# Patient Record
Sex: FEMALE | Race: WHITE | NOT HISPANIC OR LATINO
[De-identification: names, ages, dates, MRNs, and addresses within clinical notes are randomized per-mention and may not be internally consistent; named-entity substitution may affect disease eponyms.]

---

## 2017-05-18 ENCOUNTER — APPOINTMENT (OUTPATIENT)
Dept: MRI IMAGING | Facility: IMAGING CENTER | Age: 82
End: 2017-05-18

## 2017-05-18 ENCOUNTER — OUTPATIENT (OUTPATIENT)
Dept: OUTPATIENT SERVICES | Facility: HOSPITAL | Age: 82
LOS: 1 days | End: 2017-05-18
Payer: MEDICARE

## 2017-05-18 DIAGNOSIS — Z00.8 ENCOUNTER FOR OTHER GENERAL EXAMINATION: ICD-10-CM

## 2017-05-18 PROCEDURE — 73221 MRI JOINT UPR EXTREM W/O DYE: CPT

## 2017-07-22 ENCOUNTER — INPATIENT (INPATIENT)
Facility: HOSPITAL | Age: 82
LOS: 1 days | Discharge: HOME | DRG: 872 | End: 2017-07-24
Attending: HOSPITALIST | Admitting: HOSPITALIST
Payer: MEDICARE

## 2017-07-22 VITALS
DIASTOLIC BLOOD PRESSURE: 82 MMHG | SYSTOLIC BLOOD PRESSURE: 157 MMHG | TEMPERATURE: 100 F | RESPIRATION RATE: 21 BRPM | HEART RATE: 105 BPM | OXYGEN SATURATION: 95 %

## 2017-07-22 DIAGNOSIS — R52 PAIN, UNSPECIFIED: ICD-10-CM

## 2017-07-22 DIAGNOSIS — E87.1 HYPO-OSMOLALITY AND HYPONATREMIA: ICD-10-CM

## 2017-07-22 DIAGNOSIS — R74.8 ABNORMAL LEVELS OF OTHER SERUM ENZYMES: ICD-10-CM

## 2017-07-22 DIAGNOSIS — J18.9 PNEUMONIA, UNSPECIFIED ORGANISM: ICD-10-CM

## 2017-07-22 DIAGNOSIS — E78.5 HYPERLIPIDEMIA, UNSPECIFIED: ICD-10-CM

## 2017-07-22 DIAGNOSIS — E04.1 NONTOXIC SINGLE THYROID NODULE: Chronic | ICD-10-CM

## 2017-07-22 DIAGNOSIS — R11.0 NAUSEA: ICD-10-CM

## 2017-07-22 DIAGNOSIS — R50.9 FEVER, UNSPECIFIED: ICD-10-CM

## 2017-07-22 DIAGNOSIS — Z98.890 OTHER SPECIFIED POSTPROCEDURAL STATES: Chronic | ICD-10-CM

## 2017-07-22 DIAGNOSIS — A41.9 SEPSIS, UNSPECIFIED ORGANISM: ICD-10-CM

## 2017-07-22 DIAGNOSIS — R05 COUGH: ICD-10-CM

## 2017-07-22 LAB
ALBUMIN SERPL ELPH-MCNC: 4 G/DL — SIGNIFICANT CHANGE UP (ref 3.3–5)
ALP SERPL-CCNC: 251 U/L — HIGH (ref 40–120)
ALT FLD-CCNC: 63 U/L RC — HIGH (ref 10–45)
ANION GAP SERPL CALC-SCNC: 15 MMOL/L — SIGNIFICANT CHANGE UP (ref 5–17)
APPEARANCE UR: ABNORMAL
AST SERPL-CCNC: 49 U/L — HIGH (ref 10–40)
BACTERIA # UR AUTO: ABNORMAL /HPF
BASE EXCESS BLDV CALC-SCNC: 1.7 MMOL/L — SIGNIFICANT CHANGE UP (ref -2–2)
BASOPHILS # BLD AUTO: 0 K/UL — SIGNIFICANT CHANGE UP (ref 0–0.2)
BASOPHILS NFR BLD AUTO: 0.2 % — SIGNIFICANT CHANGE UP (ref 0–2)
BILIRUB SERPL-MCNC: 0.5 MG/DL — SIGNIFICANT CHANGE UP (ref 0.2–1.2)
BILIRUB UR-MCNC: NEGATIVE — SIGNIFICANT CHANGE UP
BUN SERPL-MCNC: 13 MG/DL — SIGNIFICANT CHANGE UP (ref 7–23)
CA-I SERPL-SCNC: 1.11 MMOL/L — LOW (ref 1.12–1.3)
CALCIUM SERPL-MCNC: 8.9 MG/DL — SIGNIFICANT CHANGE UP (ref 8.4–10.5)
CHLORIDE BLDV-SCNC: 96 MMOL/L — SIGNIFICANT CHANGE UP (ref 96–108)
CHLORIDE SERPL-SCNC: 92 MMOL/L — LOW (ref 96–108)
CO2 BLDV-SCNC: 27 MMOL/L — SIGNIFICANT CHANGE UP (ref 22–30)
CO2 SERPL-SCNC: 24 MMOL/L — SIGNIFICANT CHANGE UP (ref 22–31)
COLOR SPEC: YELLOW — SIGNIFICANT CHANGE UP
CREAT SERPL-MCNC: 0.81 MG/DL — SIGNIFICANT CHANGE UP (ref 0.5–1.3)
DIFF PNL FLD: ABNORMAL
EOSINOPHIL # BLD AUTO: 0 K/UL — SIGNIFICANT CHANGE UP (ref 0–0.5)
EOSINOPHIL NFR BLD AUTO: 0.2 % — SIGNIFICANT CHANGE UP (ref 0–6)
GAS PNL BLDV: 129 MMOL/L — LOW (ref 136–145)
GAS PNL BLDV: SIGNIFICANT CHANGE UP
GAS PNL BLDV: SIGNIFICANT CHANGE UP
GLUCOSE BLDV-MCNC: 113 MG/DL — HIGH (ref 70–99)
GLUCOSE SERPL-MCNC: 112 MG/DL — HIGH (ref 70–99)
GLUCOSE UR QL: NEGATIVE — SIGNIFICANT CHANGE UP
HCO3 BLDV-SCNC: 26 MMOL/L — SIGNIFICANT CHANGE UP (ref 21–29)
HCT VFR BLD CALC: 35.6 % — SIGNIFICANT CHANGE UP (ref 34.5–45)
HCT VFR BLDA CALC: 38 % — LOW (ref 39–50)
HGB BLD CALC-MCNC: 12.4 G/DL — SIGNIFICANT CHANGE UP (ref 11.5–15.5)
HGB BLD-MCNC: 12.4 G/DL — SIGNIFICANT CHANGE UP (ref 11.5–15.5)
HOROWITZ INDEX BLDV+IHG-RTO: SIGNIFICANT CHANGE UP
KETONES UR-MCNC: ABNORMAL
LACTATE BLDV-MCNC: 1.3 MMOL/L — SIGNIFICANT CHANGE UP (ref 0.7–2)
LEUKOCYTE ESTERASE UR-ACNC: NEGATIVE — SIGNIFICANT CHANGE UP
LYMPHOCYTES # BLD AUTO: 0.7 K/UL — LOW (ref 1–3.3)
LYMPHOCYTES # BLD AUTO: 5.5 % — LOW (ref 13–44)
MCHC RBC-ENTMCNC: 32.6 PG — SIGNIFICANT CHANGE UP (ref 27–34)
MCHC RBC-ENTMCNC: 34.7 GM/DL — SIGNIFICANT CHANGE UP (ref 32–36)
MCV RBC AUTO: 93.9 FL — SIGNIFICANT CHANGE UP (ref 80–100)
MONOCYTES # BLD AUTO: 0.9 K/UL — SIGNIFICANT CHANGE UP (ref 0–0.9)
MONOCYTES NFR BLD AUTO: 7.9 % — SIGNIFICANT CHANGE UP (ref 2–14)
NEUTROPHILS # BLD AUTO: 10.3 K/UL — HIGH (ref 1.8–7.4)
NEUTROPHILS NFR BLD AUTO: 86.2 % — HIGH (ref 43–77)
NITRITE UR-MCNC: NEGATIVE — SIGNIFICANT CHANGE UP
OTHER CELLS CSF MANUAL: 9 ML/DL — LOW (ref 18–22)
PCO2 BLDV: 40 MMHG — SIGNIFICANT CHANGE UP (ref 35–50)
PH BLDV: 7.42 — SIGNIFICANT CHANGE UP (ref 7.35–7.45)
PH UR: 6 — SIGNIFICANT CHANGE UP (ref 5–8)
PLATELET # BLD AUTO: 232 K/UL — SIGNIFICANT CHANGE UP (ref 150–400)
PO2 BLDV: 28 MMHG — SIGNIFICANT CHANGE UP (ref 25–45)
POTASSIUM BLDV-SCNC: 3.4 MMOL/L — LOW (ref 3.5–5)
POTASSIUM SERPL-MCNC: 3.7 MMOL/L — SIGNIFICANT CHANGE UP (ref 3.5–5.3)
POTASSIUM SERPL-SCNC: 3.7 MMOL/L — SIGNIFICANT CHANGE UP (ref 3.5–5.3)
PROT SERPL-MCNC: 7.7 G/DL — SIGNIFICANT CHANGE UP (ref 6–8.3)
PROT UR-MCNC: 100 MG/DL
RAPID RVP RESULT: DETECTED
RBC # BLD: 3.79 M/UL — LOW (ref 3.8–5.2)
RBC # FLD: 11.7 % — SIGNIFICANT CHANGE UP (ref 10.3–14.5)
RBC CASTS # UR COMP ASSIST: SIGNIFICANT CHANGE UP /HPF (ref 0–2)
RV+EV RNA SPEC QL NAA+PROBE: DETECTED
SAO2 % BLDV: 52 % — LOW (ref 67–88)
SODIUM SERPL-SCNC: 131 MMOL/L — LOW (ref 135–145)
SP GR SPEC: 1.03 — HIGH (ref 1.01–1.02)
UROBILINOGEN FLD QL: NEGATIVE — SIGNIFICANT CHANGE UP
WBC # BLD: 11.9 K/UL — HIGH (ref 3.8–10.5)
WBC # FLD AUTO: 11.9 K/UL — HIGH (ref 3.8–10.5)
WBC UR QL: SIGNIFICANT CHANGE UP /HPF (ref 0–5)

## 2017-07-22 PROCEDURE — 71020: CPT | Mod: 26

## 2017-07-22 PROCEDURE — 99285 EMERGENCY DEPT VISIT HI MDM: CPT | Mod: GC

## 2017-07-22 RX ORDER — ACETAMINOPHEN 500 MG
1000 TABLET ORAL ONCE
Qty: 0 | Refills: 0 | Status: COMPLETED | OUTPATIENT
Start: 2017-07-22 | End: 2017-07-22

## 2017-07-22 RX ORDER — SODIUM CHLORIDE 9 MG/ML
500 INJECTION INTRAMUSCULAR; INTRAVENOUS; SUBCUTANEOUS ONCE
Qty: 0 | Refills: 0 | Status: COMPLETED | OUTPATIENT
Start: 2017-07-22 | End: 2017-07-22

## 2017-07-22 RX ORDER — DORZOLAMIDE HYDROCHLORIDE TIMOLOL MALEATE 20; 5 MG/ML; MG/ML
1 SOLUTION/ DROPS OPHTHALMIC
Qty: 0 | Refills: 0 | COMMUNITY

## 2017-07-22 RX ORDER — ONDANSETRON 8 MG/1
4 TABLET, FILM COATED ORAL ONCE
Qty: 0 | Refills: 0 | Status: COMPLETED | OUTPATIENT
Start: 2017-07-22 | End: 2017-07-22

## 2017-07-22 RX ORDER — DORZOLAMIDE HYDROCHLORIDE 20 MG/ML
1 SOLUTION/ DROPS OPHTHALMIC
Qty: 0 | Refills: 0 | COMMUNITY

## 2017-07-22 RX ORDER — CEFTRIAXONE 500 MG/1
1 INJECTION, POWDER, FOR SOLUTION INTRAMUSCULAR; INTRAVENOUS ONCE
Qty: 0 | Refills: 0 | Status: COMPLETED | OUTPATIENT
Start: 2017-07-22 | End: 2017-07-22

## 2017-07-22 RX ORDER — OXYCODONE AND ACETAMINOPHEN 5; 325 MG/1; MG/1
1 TABLET ORAL EVERY 6 HOURS
Qty: 0 | Refills: 0 | Status: DISCONTINUED | OUTPATIENT
Start: 2017-07-22 | End: 2017-07-24

## 2017-07-22 RX ORDER — SERTRALINE 25 MG/1
1 TABLET, FILM COATED ORAL
Qty: 0 | Refills: 0 | COMMUNITY

## 2017-07-22 RX ORDER — ASPIRIN/CALCIUM CARB/MAGNESIUM 324 MG
1 TABLET ORAL
Qty: 0 | Refills: 0 | COMMUNITY

## 2017-07-22 RX ORDER — AZITHROMYCIN 500 MG/1
500 TABLET, FILM COATED ORAL ONCE
Qty: 0 | Refills: 0 | Status: COMPLETED | OUTPATIENT
Start: 2017-07-22 | End: 2017-07-22

## 2017-07-22 RX ORDER — LEVOTHYROXINE SODIUM 125 MCG
1 TABLET ORAL
Qty: 0 | Refills: 0 | COMMUNITY

## 2017-07-22 RX ORDER — METOCLOPRAMIDE HCL 10 MG
5 TABLET ORAL ONCE
Qty: 0 | Refills: 0 | Status: COMPLETED | OUTPATIENT
Start: 2017-07-22 | End: 2017-07-22

## 2017-07-22 RX ORDER — DOCUSATE SODIUM 100 MG
3 CAPSULE ORAL
Qty: 0 | Refills: 0 | COMMUNITY

## 2017-07-22 RX ORDER — IPRATROPIUM/ALBUTEROL SULFATE 18-103MCG
3 AEROSOL WITH ADAPTER (GRAM) INHALATION EVERY 6 HOURS
Qty: 0 | Refills: 0 | Status: DISCONTINUED | OUTPATIENT
Start: 2017-07-22 | End: 2017-07-24

## 2017-07-22 RX ORDER — LATANOPROST 0.05 MG/ML
1 SOLUTION/ DROPS OPHTHALMIC; TOPICAL
Qty: 0 | Refills: 0 | COMMUNITY

## 2017-07-22 RX ORDER — SERTRALINE 25 MG/1
0 TABLET, FILM COATED ORAL
Qty: 0 | Refills: 0 | COMMUNITY

## 2017-07-22 RX ORDER — SODIUM CHLORIDE 9 MG/ML
1000 INJECTION INTRAMUSCULAR; INTRAVENOUS; SUBCUTANEOUS
Qty: 0 | Refills: 0 | Status: DISCONTINUED | OUTPATIENT
Start: 2017-07-22 | End: 2017-07-23

## 2017-07-22 RX ORDER — OMEPRAZOLE 10 MG/1
1 CAPSULE, DELAYED RELEASE ORAL
Qty: 0 | Refills: 0 | COMMUNITY

## 2017-07-22 RX ORDER — ACETAMINOPHEN 500 MG
650 TABLET ORAL EVERY 6 HOURS
Qty: 0 | Refills: 0 | Status: DISCONTINUED | OUTPATIENT
Start: 2017-07-22 | End: 2017-07-24

## 2017-07-22 RX ORDER — PANTOPRAZOLE SODIUM 20 MG/1
40 TABLET, DELAYED RELEASE ORAL
Qty: 0 | Refills: 0 | Status: DISCONTINUED | OUTPATIENT
Start: 2017-07-22 | End: 2017-07-24

## 2017-07-22 RX ORDER — ONDANSETRON 8 MG/1
4 TABLET, FILM COATED ORAL EVERY 8 HOURS
Qty: 0 | Refills: 0 | Status: DISCONTINUED | OUTPATIENT
Start: 2017-07-22 | End: 2017-07-24

## 2017-07-22 RX ADMIN — SODIUM CHLORIDE 50 MILLILITER(S): 9 INJECTION INTRAMUSCULAR; INTRAVENOUS; SUBCUTANEOUS at 10:46

## 2017-07-22 RX ADMIN — CEFTRIAXONE 100 GRAM(S): 500 INJECTION, POWDER, FOR SOLUTION INTRAMUSCULAR; INTRAVENOUS at 04:04

## 2017-07-22 RX ADMIN — Medication 5 MILLIGRAM(S): at 17:34

## 2017-07-22 RX ADMIN — Medication 3 MILLILITER(S): at 10:46

## 2017-07-22 RX ADMIN — SODIUM CHLORIDE 1000 MILLILITER(S): 9 INJECTION INTRAMUSCULAR; INTRAVENOUS; SUBCUTANEOUS at 04:05

## 2017-07-22 RX ADMIN — Medication 1000 MILLIGRAM(S): at 05:32

## 2017-07-22 RX ADMIN — ONDANSETRON 4 MILLIGRAM(S): 8 TABLET, FILM COATED ORAL at 12:32

## 2017-07-22 RX ADMIN — Medication 400 MILLIGRAM(S): at 03:19

## 2017-07-22 RX ADMIN — AZITHROMYCIN 250 MILLIGRAM(S): 500 TABLET, FILM COATED ORAL at 05:32

## 2017-07-22 RX ADMIN — ONDANSETRON 4 MILLIGRAM(S): 8 TABLET, FILM COATED ORAL at 03:20

## 2017-07-22 RX ADMIN — Medication 3 MILLILITER(S): at 21:23

## 2017-07-22 NOTE — H&P ADULT - PMH
Anxiety    Back ache    Depression    Glaucoma    H pylori ulcer    HLD (hyperlipidemia)    Thyroid disease

## 2017-07-22 NOTE — H&P ADULT - NSHPSOCIALHISTORY_GEN_ALL_CORE
Social History: retired nurse at [employer], 15 year remote history of smoking, nonsignificant social alcohol use, patient lives at home with ex- and son lives nearby

## 2017-07-22 NOTE — ED PROVIDER NOTE - ATTENDING CONTRIBUTION TO CARE
MD Jeffrey:  patient seen and evaluated with the resident.  I was present for key portions of the History & Physical, and I agree with the Impression & Plan.  MD Jeffrey:  82 yo F, c/o 36 hours cough, fever, nausea.  Zero abd pain.  Flew < 6 hrs flight from [place] to [facility] yesterday, during which time she developed these symptoms.  VS:  Hr 105, Temp 100.2 (oral), normotensive.  Physical Exam: elderly F, nad, NCAT, +productive-sounding cough, no respiratory distress.  No calf pain or leg edema.  no CP.  Impression:  r/o PNA; if CXR clear might consider RVP.  Plan:  CXR, VBG, labs, reassess.

## 2017-07-22 NOTE — H&P ADULT - ASSESSMENT
81 year old woman PMH thyroid disease, H pylori, back pain s/p laminectomy presenting with two day history of acute onset cough with associated fever and chills, with chest x-ray revealing clear lungs and Entero/Rhino+, likely viral illness. 81 year old woman PMH thyroid disease, H pylori, back pain s/p laminectomy presenting with two day history of acute onset cough with associated fever and chills, N/V with chest x-ray revealing clear lungs and Entero/Rhino+, likely viral illness.

## 2017-07-22 NOTE — H&P ADULT - PROBLEM SELECTOR PLAN 7
- Will continue home percocet 5/325 PO PRN q6h for back pain - Patient reports subjective fevers at home, afebrile since admission, Tmax 100.2  - Acetaminophen 650 mg PRN q6h for T >100.4

## 2017-07-22 NOTE — H&P ADULT - PROBLEM SELECTOR PLAN 3
- Will hold atorvastatin for elevated liver enzymes - Nausea improved with zofran in ED  - Continue with zofran 4 mg IV q6h prn  - For decreased PO intake, will start IV fluids 50 cc/hr NS  - Continue with regular diet, advance as tolerated - Na 131, hyponatremia likely secondary to stress related to infection and/or decreased PO intake  - Will follow-up Na level  - will give gentle hydration 50cc/hr due to poor intake and vomiting

## 2017-07-22 NOTE — H&P ADULT - NSHPREVIEWOFSYSTEMS_GEN_ALL_CORE
REVIEW OF SYSTEMS:  CONSTITUTIONAL: +FEVER, weight loss, or fatigue  EYES: No changes in vision  ENMT:  +Sore Throat (mild)  NECK: No pain or stiffness  RESPIRATORY: No wheezing, or hemoptysis; No shortness of breath  CARDIOVASCULAR: No chest pain, palpitations, dizziness, or leg swelling  GASTROINTESTINAL: No abdominal or epigastric pain. No nausea, vomiting, or hematemesis; No diarrhea or constipation. No melena or hematochezia.  GENITOURINARY: No dysuria, frequency, incontinence  NEUROLOGICAL: +Mild headaches, No confusion  SKIN: No rashes or lesions  LYMPH NODES: No enlarged glands REVIEW OF SYSTEMS:  CONSTITUTIONAL: No weight loss  EYES: No changes in vision  ENMT:  +Sore Throat (mild)  NECK: No pain or stiffness  RESPIRATORY: No wheezing, or hemoptysis; No shortness of breath  CARDIOVASCULAR: No chest pain, palpitations, dizziness, or leg swelling  GASTROINTESTINAL: +N/V No abdominal or epigastric pain. No hematemesis; No diarrhea or constipation. No melena or hematochezia.  GENITOURINARY: No dysuria, frequency, incontinence  NEUROLOGICAL: +Mild headaches, No confusion  SKIN: No rashes or lesions  LYMPH NODES: No enlarged glands

## 2017-07-22 NOTE — H&P ADULT - PROBLEM SELECTOR PLAN 5
- Alk phos 251, ALT 63, AST 49  - Will determine baseline LFTs  - Will hold atorvastatin - Will hold atorvastatin for elevated liver enzymes - Alk phos 251, ALT 63, AST 49  - Will determine baseline LFTs, continue to monitor  - Will hold atorvastatin

## 2017-07-22 NOTE — ED ADULT NURSE NOTE - OBJECTIVE STATEMENT
80 yo female presents to the ED from home c/o cough, N/V, HA, chills x2days. patient states symptoms began when she was in [place]. patient states fever today of 100.5. patient is AAOx4. lung sounds wheezes bilaterally. cap refill <3sec. cough present, non-productive. patient denies blood in vomit, chest pain, SOB, HA, dizziness, urinary frequency or urgency, abdominal pain, back pain. VSS. MD at bedside.

## 2017-07-22 NOTE — H&P ADULT - NSHPLABSRESULTS_GEN_ALL_CORE
LABS:                        12.4   11.9  )-----------( 232      ( 2017 03:12 )             35.6     WBC Trend: 11.9<--      131<L>  |  92<L>  |  13  ----------------------------<  112<H>  3.7   |  24  |  0.81    Ca    8.9      2017 03:12    TPro  7.7  /  Alb  4.0  /  TBili  0.5  /  DBili  x   /  AST  49<H>  /  ALT  63<H>  /  AlkPhos  251<H>      Creatinine Trend: 0.81<--        Urinalysis Basic - ( 2017 06:46 )    Color: Yellow / Appearance: x / S.029 / pH: x  Gluc: x / Ketone: Trace  / Bili: Negative / Urobili: Negative   Blood: x / Protein: 100 mg/dL / Nitrite: Negative   Leuk Esterase: Negative / RBC: 3-5 /HPF / WBC 3-5 /HPF   Sq Epi: x / Non Sq Epi: x / Bacteria: Few /HPF    Rhino/Enterovirus Positive        RADIOLOGY & ADDITIONAL TESTS: Chest X-ray - clear lungs    Imaging Personally Reviewed: Chest X-ray    Consultant(s) Notes Reviewed:      Care Discussed with Consultants/Other Providers:

## 2017-07-22 NOTE — H&P ADULT - PROBLEM SELECTOR PLAN 6
- Patient reports subjective fevers at home, afebrile since admission, Tmax 100.2  - Acetaminophen 650 mg q6h prn for T >100.4 - Patient reports subjective fevers at home, afebrile since admission, Tmax 100.2  - Acetaminophen 650 mg PRN q6h for T >100.4 - Will hold atorvastatin for elevated liver enzymes

## 2017-07-22 NOTE — ED PROVIDER NOTE - MEDICAL DECISION MAKING DETAILS
Cough non-productive with fevers and severe nausea and vomiting. Concern for pna will get lab work and check for other infectious etiology. No abd pain or tenderness.

## 2017-07-22 NOTE — ED PROVIDER NOTE - OBJECTIVE STATEMENT
81 YOF with cough x 2 days non-productive, +chills +subjective fever. Recent flight from [place]. Denies SOB, Denies calf pain. Pt was vomiting while on the flight and has continued to be nauseated and vomiting for a few days. Pt has not been tolerating PO.   PMD: Piter Armando  nauseated +vomiting, decreased  Pt chest pain with coughing.

## 2017-07-22 NOTE — H&P ADULT - PROBLEM SELECTOR PLAN 1
- 2 day history of cough with associated fever and nausea, likely viral illness with +Entero/Rhino and absence of consolidation on chest x-ray  - CAP less likely, will discontinue antibiotics and monitor  - Follow-up blood cx, urine cx, and legionella antigen test - 2 day history of cough with associated fever and nausea, likely viral illness with +Entero/Rhino and absence of consolidation on chest x-ray  - CAP less likely, will discontinue antibiotics and monitor  - Follow-up blood cx, urine cx, and legionella antigen test  - Will start Duoneb 3 mL PRN q6h for wheezing or shortness of breath - 2 day history of cough with associated fever and nausea, likely viral illness with +Entero/Rhino and absence of consolidation on chest x-ray  - CAP less likely, will discontinue antibiotics and monitor  - Follow-up blood cx, urine cx, and legionella antigen test  - Will start Duoneb 3 mL PRN q6h for wheezing or shortness of breath  -supportive care as needed - Patient WBC 11.9, , T 100.2 in ED, meets sepsis, likely in setting of +Entero/Rhinovurs  - Follow-up blood cx, urine cx, and legionella antigen test

## 2017-07-22 NOTE — H&P ADULT - FAMILY HISTORY
Sibling  Still living? Unknown  Family history of non-Hodgkin's lymphoma, Age at diagnosis: Age Unknown  Family history of coronary artery disease, Age at diagnosis: Age Unknown  Family history of stomach cancer, Age at diagnosis: Age Unknown

## 2017-07-22 NOTE — ED ADULT NURSE REASSESSMENT NOTE - NS ED NURSE REASSESS COMMENT FT1
Received elderly female alert and oriented x4 actively coughing. Pt reports that she does feel better now prior to ER arrival. Pt reports a non productive cough ,vitals signs stable. Pt given to holding.

## 2017-07-22 NOTE — H&P ADULT - HISTORY OF PRESENT ILLNESS
87 year old F patient with PMH of thyroid disease, back pain (s/p laminotomy 2014), H. Pylori, depression is admitted with a 2 day history of cough and subjective fever. The patient returned yesterday morning from a 2 week trip to [facility] [id number]. The patient reports she was in her normal state of health until Thursday morning (7/20) when she developed a cough. She described the cough bothered her the entire day. She also experienced a decreased appetite. On Friday morning, the patient's cough worsened and "became full-blown," and also experienced severe nausea. The patient boarded a 6 hour flight from [place] to [place] and experienced nausea and vomiting on the plane, vomiting 6 times during flight, each time productive with mucus with no blood. The patient has eaten very little since Thursday, last meal was sips of soup. 87 year old F patient with PMH of thyroid disease, back pain (s/p laminotomy 2014), H. Pylori, depression is admitted with a 2 day history of cough and subjective fever. The patient returned yesterday morning from a 2 week trip to [place]. The patient reports she was in her normal state of health until Thursday morning (7/20) when she developed a cough. She also felt chills and took one tylenol. She described the cough bothered her the entire day. She also experienced a decreased appetite. On Friday morning, the patient's cough worsened and "became full-blown," and also experienced severe nausea. The patient boarded a 6 hour flight from [place] to [place] and experienced nausea and vomiting on the plane, vomiting 6 times during flight, each time productive with mucus with no blood. The patient has eaten very little since Thursday, last meal was sips of soup. The patient reports chest pain experienced only with coughing. Denies any shortness of breath. The patient denies any abdominal pain, constipation, or diarrhea. The patient one sick contact, her sister who had a cough, on Sunday 7/16. Denies any contact with animals.    PMH:  Thyroid Disease  Depression  H. Pylori  Back Pain (s/p laminectomy)  Glaucoma  Hyperlipidemia 81 year old F patient with PMH of thyroid disease, back pain (s/p laminotomy 2014), H. Pylori, depression is admitted with a 2 day history of cough and subjective fever. The patient returned yesterday morning from a 2 week trip to [place]. The patient reports she was in her normal state of health until Thursday morning (7/20) when she developed a cough. She also felt chills and took one tylenol. She described the cough bothered her the entire day. She also experienced a decreased appetite. On Friday morning, the patient's cough worsened and "became full-blown," and also experienced severe nausea. The patient boarded a 6 hour flight from [place] to [place] and experienced nausea and vomiting on the plane, vomiting 6 times during flight, each time productive with mucus with no blood. The patient has eaten very little since Thursday, last meal was sips of soup. The patient reports chest pain experienced only with coughing. Denies any shortness of breath. The patient denies any abdominal pain, constipation, or diarrhea. The patient one sick contact, her sister who had a cough, on Sunday 7/16. Denies any contact with animals.    PMH:  Thyroid Disease  Depression  H. Pylori  Back Pain (s/p laminectomy)  Glaucoma  Hyperlipidemia 81 year old F patient with PMH of thyroid disease, back pain (s/p laminotomy 2014), H. Pylori, depression is admitted with a 2 day history of cough and subjective fever. The patient returned yesterday morning from a 2 week trip to [place]. The patient reports she was in her normal state of health until Thursday morning (7/20) when she developed a cough. She also felt chills and took one tylenol. She described the cough bothered her the entire day. She also experienced a decreased appetite. On Friday morning, the patient's cough worsened and "became full-blown," and also experienced severe nausea. The patient boarded a 6 hour flight from [place] to [place] and experienced nausea and vomiting on the plane, vomiting 6 times during flight, each time productive with mucus with no blood. The patient has eaten very little since Thursday, last meal was sips of soup. The patient reports chest pain experienced only with coughing. Denies any shortness of breath. The patient denies any abdominal pain, constipation, or diarrhea. The patient one sick contact, her sister who had a cough, on Sunday 7/16. Denies any contact with animals.    PMH:  Thyroid Disease  Depression  H. Pylori  Back Pain (s/p laminectomy)  Glaucoma  Hyperlipidemia    Allergies: no known drug allergies  Medications: 81 year old F patient with PMH of thyroid disease, back pain (s/p laminotomy 2014), H. Pylori, depression is admitted with a 2 day history of cough and subjective fever. The patient returned yesterday morning from a 2 week trip to [facility] [employer]. The patient reports she was in her normal state of health until Thursday morning (7/20) when she developed a cough. She described the cough bothered her the entire day and worsened. She describes the cough as non-productive, but feeling like she needs to cough something up. She also experienced a decreased appetite. On Friday morning, the patient's cough worsened and "became full-blown," and also experienced severe nausea. The patient boarded a 6 hour flight from [place] to [place] and experienced nausea on plane, vomiting 6 times during flight, each time productive with mucus and no blood. The patient has eaten little since Thursday, last meal was a few sips of soup Friday afternoon. The patient reports chest pain experienced only with coughing. Patient also endorses a mild headache. Denies any chest pain at rest or shortness of breath. The patient denies any abdominal pain, constipation, or diarrhea. The patient encountered one sick contact, her sister who had a cough, on Sunday 7/16. Denies any contact with animals. 81 year old F patient with PMH of thyroid disease, back pain (s/p laminotomy 2014), H. Pylori, and depression admitted with a 2 day history of cough and subjective fever. The patient returned yesterday morning from a 2 week trip to [place]. The patient reports she was in her normal state of health until Thursday morning (7/20) when she developed a cough. She described the cough bothered her the entire day and worsened. She describes the cough as non-productive, but feeling like she needs to cough something up. She also experienced a decreased appetite. On Friday morning, the patient's cough worsened and "became full-blown," and also experienced severe nausea. The patient boarded a 6 hour flight from [place] to [place] and experienced nausea on plane, vomiting 6 times during flight, each time productive with mucus and no blood. The patient has eaten little since Thursday, last meal was a few sips of soup Friday afternoon. The patient reports chest pain experienced only with coughing. Patient also endorses a mild headache. Denies any chest pain at rest or shortness of breath. The patient denies any abdominal pain, constipation, or diarrhea. The patient encountered one sick contact, her sister who had a cough, on Sunday 7/16. Denies any contact with animals.

## 2017-07-22 NOTE — H&P ADULT - PROBLEM SELECTOR PLAN 2
- Na 131, hyponatremia likely secondary to stress related to infection and decreased PO intake - Na 131, hyponatremia likely secondary to stress related to infection and/or decreased PO intake  - Will follow-up Na level - Na 131, hyponatremia likely secondary to stress related to infection and/or decreased PO intake  - Will follow-up Na level  - will give gentle hydration 50cc/hr due to poor intake and vomiting - 2 day history of cough with associated fever and nausea, likely viral illness with +Entero/Rhino and absence of consolidation on chest x-ray  - Will start Duoneb 3 mL PRN q6h for wheezing or shortness of breath  -supportive care as needed

## 2017-07-23 DIAGNOSIS — R63.8 OTHER SYMPTOMS AND SIGNS CONCERNING FOOD AND FLUID INTAKE: ICD-10-CM

## 2017-07-23 DIAGNOSIS — Z29.9 ENCOUNTER FOR PROPHYLACTIC MEASURES, UNSPECIFIED: ICD-10-CM

## 2017-07-23 LAB
ALBUMIN SERPL ELPH-MCNC: 3.4 G/DL — SIGNIFICANT CHANGE UP (ref 3.3–5)
ALP SERPL-CCNC: 238 U/L — HIGH (ref 40–120)
ALT FLD-CCNC: 41 U/L — SIGNIFICANT CHANGE UP (ref 10–45)
ANION GAP SERPL CALC-SCNC: 14 MMOL/L — SIGNIFICANT CHANGE UP (ref 5–17)
AST SERPL-CCNC: 37 U/L — SIGNIFICANT CHANGE UP (ref 10–40)
BASOPHILS # BLD AUTO: 0.02 K/UL — SIGNIFICANT CHANGE UP (ref 0–0.2)
BASOPHILS NFR BLD AUTO: 0.2 % — SIGNIFICANT CHANGE UP (ref 0–2)
BILIRUB SERPL-MCNC: 0.4 MG/DL — SIGNIFICANT CHANGE UP (ref 0.2–1.2)
BUN SERPL-MCNC: 8 MG/DL — SIGNIFICANT CHANGE UP (ref 7–23)
CALCIUM SERPL-MCNC: 8.7 MG/DL — SIGNIFICANT CHANGE UP (ref 8.4–10.5)
CHLORIDE SERPL-SCNC: 97 MMOL/L — SIGNIFICANT CHANGE UP (ref 96–108)
CO2 SERPL-SCNC: 23 MMOL/L — SIGNIFICANT CHANGE UP (ref 22–31)
CREAT SERPL-MCNC: 0.67 MG/DL — SIGNIFICANT CHANGE UP (ref 0.5–1.3)
CULTURE RESULTS: SIGNIFICANT CHANGE UP
EOSINOPHIL # BLD AUTO: 0.03 K/UL — SIGNIFICANT CHANGE UP (ref 0–0.5)
EOSINOPHIL NFR BLD AUTO: 0.3 % — SIGNIFICANT CHANGE UP (ref 0–6)
GLUCOSE SERPL-MCNC: 86 MG/DL — SIGNIFICANT CHANGE UP (ref 70–99)
HCT VFR BLD CALC: 31.2 % — LOW (ref 34.5–45)
HGB BLD-MCNC: 10.5 G/DL — LOW (ref 11.5–15.5)
IMM GRANULOCYTES NFR BLD AUTO: 0.2 % — SIGNIFICANT CHANGE UP (ref 0–1.5)
LEGIONELLA AG UR QL: NEGATIVE — SIGNIFICANT CHANGE UP
LYMPHOCYTES # BLD AUTO: 1.1 K/UL — SIGNIFICANT CHANGE UP (ref 1–3.3)
LYMPHOCYTES # BLD AUTO: 9.8 % — LOW (ref 13–44)
MAGNESIUM SERPL-MCNC: 2 MG/DL — SIGNIFICANT CHANGE UP (ref 1.6–2.6)
MCHC RBC-ENTMCNC: 30.3 PG — SIGNIFICANT CHANGE UP (ref 27–34)
MCHC RBC-ENTMCNC: 33.7 GM/DL — SIGNIFICANT CHANGE UP (ref 32–36)
MCV RBC AUTO: 89.9 FL — SIGNIFICANT CHANGE UP (ref 80–100)
MONOCYTES # BLD AUTO: 0.83 K/UL — SIGNIFICANT CHANGE UP (ref 0–0.9)
MONOCYTES NFR BLD AUTO: 7.4 % — SIGNIFICANT CHANGE UP (ref 2–14)
NEUTROPHILS # BLD AUTO: 9.23 K/UL — HIGH (ref 1.8–7.4)
NEUTROPHILS NFR BLD AUTO: 82.1 % — HIGH (ref 43–77)
PHOSPHATE SERPL-MCNC: 2.4 MG/DL — LOW (ref 2.5–4.5)
PLATELET # BLD AUTO: 225 K/UL — SIGNIFICANT CHANGE UP (ref 150–400)
POTASSIUM SERPL-MCNC: 3.8 MMOL/L — SIGNIFICANT CHANGE UP (ref 3.5–5.3)
POTASSIUM SERPL-SCNC: 3.8 MMOL/L — SIGNIFICANT CHANGE UP (ref 3.5–5.3)
PROT SERPL-MCNC: 6.8 G/DL — SIGNIFICANT CHANGE UP (ref 6–8.3)
RBC # BLD: 3.47 M/UL — LOW (ref 3.8–5.2)
RBC # FLD: 13.9 % — SIGNIFICANT CHANGE UP (ref 10.3–14.5)
SODIUM SERPL-SCNC: 134 MMOL/L — LOW (ref 135–145)
SPECIMEN SOURCE: SIGNIFICANT CHANGE UP
WBC # BLD: 11.23 K/UL — HIGH (ref 3.8–10.5)
WBC # FLD AUTO: 11.23 K/UL — HIGH (ref 3.8–10.5)

## 2017-07-23 PROCEDURE — 99233 SBSQ HOSP IP/OBS HIGH 50: CPT

## 2017-07-23 RX ORDER — POLYETHYLENE GLYCOL 3350 17 G/17G
17 POWDER, FOR SOLUTION ORAL DAILY
Qty: 0 | Refills: 0 | Status: DISCONTINUED | OUTPATIENT
Start: 2017-07-23 | End: 2017-07-24

## 2017-07-23 RX ORDER — SODIUM CHLORIDE 9 MG/ML
1000 INJECTION INTRAMUSCULAR; INTRAVENOUS; SUBCUTANEOUS
Qty: 0 | Refills: 0 | Status: DISCONTINUED | OUTPATIENT
Start: 2017-07-23 | End: 2017-07-24

## 2017-07-23 RX ORDER — BENZOCAINE AND MENTHOL 5; 1 G/100ML; G/100ML
1 LIQUID ORAL ONCE
Qty: 0 | Refills: 0 | Status: COMPLETED | OUTPATIENT
Start: 2017-07-23 | End: 2017-07-23

## 2017-07-23 RX ORDER — SENNA PLUS 8.6 MG/1
1 TABLET ORAL AT BEDTIME
Qty: 0 | Refills: 0 | Status: DISCONTINUED | OUTPATIENT
Start: 2017-07-23 | End: 2017-07-24

## 2017-07-23 RX ORDER — HEPARIN SODIUM 5000 [USP'U]/ML
5000 INJECTION INTRAVENOUS; SUBCUTANEOUS EVERY 8 HOURS
Qty: 0 | Refills: 0 | Status: DISCONTINUED | OUTPATIENT
Start: 2017-07-23 | End: 2017-07-24

## 2017-07-23 RX ADMIN — Medication 3 MILLILITER(S): at 21:20

## 2017-07-23 RX ADMIN — Medication 3 MILLILITER(S): at 05:15

## 2017-07-23 RX ADMIN — HEPARIN SODIUM 5000 UNIT(S): 5000 INJECTION INTRAVENOUS; SUBCUTANEOUS at 21:16

## 2017-07-23 RX ADMIN — SODIUM CHLORIDE 50 MILLILITER(S): 9 INJECTION INTRAMUSCULAR; INTRAVENOUS; SUBCUTANEOUS at 10:13

## 2017-07-23 RX ADMIN — HEPARIN SODIUM 5000 UNIT(S): 5000 INJECTION INTRAVENOUS; SUBCUTANEOUS at 12:20

## 2017-07-23 RX ADMIN — BENZOCAINE AND MENTHOL 1 LOZENGE: 5; 1 LIQUID ORAL at 12:19

## 2017-07-23 RX ADMIN — PANTOPRAZOLE SODIUM 40 MILLIGRAM(S): 20 TABLET, DELAYED RELEASE ORAL at 05:15

## 2017-07-23 NOTE — PROGRESS NOTE ADULT - ATTENDING COMMENTS
Patient seen and examined.  Agree with resident note as above.  Meds, labs and vitals all reviewed.  Patient with Enterovirus/Rhinovirus sepsis.   Currently being treated with supportive interventions.   Patient is still having some nausea and difficulty tolerating full PO.  Supportive care, Zofran.

## 2017-07-23 NOTE — PROGRESS NOTE ADULT - ASSESSMENT
81 year old woman PMH thyroid disease, H pylori, back pain s/p laminectomy presenting with three day history of acute onset cough with associated fever, chills, Nausea, and vomiting, diffuse wheezes and rhonchi b/l, chest x-ray revealing clear lungs and Entero/Rhino+, likely viral bronchitis.

## 2017-07-23 NOTE — PROGRESS NOTE ADULT - PROBLEM SELECTOR PLAN 7
- Patient reports subjective fevers at home, afebrile since admission, Tmax 100.2  - Acetaminophen 650 mg PRN q6h for T >100.4

## 2017-07-23 NOTE — PROGRESS NOTE ADULT - PROBLEM SELECTOR PLAN 1
- Patient WBC 11.9, , T 100.2 in ED, met sepsis criteria, likely in setting of +Entero/Rhinovirus viral bronchitis, sepsis resolved  - Legionella antigen negative  - Blood culture and urine culture negative to date

## 2017-07-23 NOTE — PROGRESS NOTE ADULT - PROBLEM SELECTOR PLAN 2
- 2 day history of cough with associated fever and nausea, likely viral bronchitis with +Entero/Rhino and absence of consolidation on chest x-ray  - Duoneb PRN q6h for wheezing or shortness of breath  - supportive care as needed

## 2017-07-23 NOTE — PROGRESS NOTE ADULT - PROBLEM SELECTOR PLAN 1
- Patient WBC 11.9, , T 100.2 in ED, met sepsis criteria, likely in setting of +Entero/Rhinovirus viral bronchitis  - Legionella antigen negative  - Blood culture and urine culture negative to date

## 2017-07-23 NOTE — PROGRESS NOTE ADULT - PROBLEM SELECTOR PLAN 4
- Nausea improved with 1x reglan  - Continue with zofran 4 mg IV q8h prn  - For decreased PO intake, will continue IV fluids 50 cc/hr NS  - Continue with regular diet, advance as tolerated

## 2017-07-23 NOTE — PROGRESS NOTE ADULT - PROBLEM SELECTOR PLAN 3
- Na 131, hyponatremia likely secondary to stress related to infection and/or decreased PO intake  - Will follow-up Na level  - will give gentle hydration 50cc/hr due to poor intake and vomiting

## 2017-07-23 NOTE — PROGRESS NOTE ADULT - PROBLEM SELECTOR PLAN 8
- Will continue home percocet 5/325 PO PRN q6h for back pain  - Will provide Acetaminophen 650 mg PRN q6h for moderate pain

## 2017-07-24 ENCOUNTER — TRANSCRIPTION ENCOUNTER (OUTPATIENT)
Age: 82
End: 2017-07-24

## 2017-07-24 VITALS
HEART RATE: 65 BPM | SYSTOLIC BLOOD PRESSURE: 133 MMHG | TEMPERATURE: 98 F | DIASTOLIC BLOOD PRESSURE: 60 MMHG | RESPIRATION RATE: 18 BRPM | OXYGEN SATURATION: 96 %

## 2017-07-24 LAB
ALBUMIN SERPL ELPH-MCNC: 3.4 G/DL — SIGNIFICANT CHANGE UP (ref 3.3–5)
ALP SERPL-CCNC: 273 U/L — HIGH (ref 40–120)
ALT FLD-CCNC: 52 U/L — HIGH (ref 10–45)
ANION GAP SERPL CALC-SCNC: 15 MMOL/L — SIGNIFICANT CHANGE UP (ref 5–17)
AST SERPL-CCNC: 47 U/L — HIGH (ref 10–40)
BASOPHILS # BLD AUTO: 0.01 K/UL — SIGNIFICANT CHANGE UP (ref 0–0.2)
BASOPHILS NFR BLD AUTO: 0.1 % — SIGNIFICANT CHANGE UP (ref 0–2)
BILIRUB SERPL-MCNC: 0.4 MG/DL — SIGNIFICANT CHANGE UP (ref 0.2–1.2)
BUN SERPL-MCNC: 7 MG/DL — SIGNIFICANT CHANGE UP (ref 7–23)
CALCIUM SERPL-MCNC: 8.7 MG/DL — SIGNIFICANT CHANGE UP (ref 8.4–10.5)
CHLORIDE SERPL-SCNC: 95 MMOL/L — LOW (ref 96–108)
CO2 SERPL-SCNC: 24 MMOL/L — SIGNIFICANT CHANGE UP (ref 22–31)
CREAT SERPL-MCNC: 0.54 MG/DL — SIGNIFICANT CHANGE UP (ref 0.5–1.3)
EOSINOPHIL # BLD AUTO: 0.08 K/UL — SIGNIFICANT CHANGE UP (ref 0–0.5)
EOSINOPHIL NFR BLD AUTO: 0.7 % — SIGNIFICANT CHANGE UP (ref 0–6)
GLUCOSE SERPL-MCNC: 97 MG/DL — SIGNIFICANT CHANGE UP (ref 70–99)
HCT VFR BLD CALC: 30.7 % — LOW (ref 34.5–45)
HGB BLD-MCNC: 10.4 G/DL — LOW (ref 11.5–15.5)
IMM GRANULOCYTES NFR BLD AUTO: 0.2 % — SIGNIFICANT CHANGE UP (ref 0–1.5)
LYMPHOCYTES # BLD AUTO: 0.86 K/UL — LOW (ref 1–3.3)
LYMPHOCYTES # BLD AUTO: 8 % — LOW (ref 13–44)
MAGNESIUM SERPL-MCNC: 2 MG/DL — SIGNIFICANT CHANGE UP (ref 1.6–2.6)
MCHC RBC-ENTMCNC: 30.1 PG — SIGNIFICANT CHANGE UP (ref 27–34)
MCHC RBC-ENTMCNC: 33.9 GM/DL — SIGNIFICANT CHANGE UP (ref 32–36)
MCV RBC AUTO: 88.7 FL — SIGNIFICANT CHANGE UP (ref 80–100)
MONOCYTES # BLD AUTO: 0.71 K/UL — SIGNIFICANT CHANGE UP (ref 0–0.9)
MONOCYTES NFR BLD AUTO: 6.6 % — SIGNIFICANT CHANGE UP (ref 2–14)
NEUTROPHILS # BLD AUTO: 9.07 K/UL — HIGH (ref 1.8–7.4)
NEUTROPHILS NFR BLD AUTO: 84.4 % — HIGH (ref 43–77)
PHOSPHATE SERPL-MCNC: 2.9 MG/DL — SIGNIFICANT CHANGE UP (ref 2.5–4.5)
PLATELET # BLD AUTO: 273 K/UL — SIGNIFICANT CHANGE UP (ref 150–400)
POTASSIUM SERPL-MCNC: 4 MMOL/L — SIGNIFICANT CHANGE UP (ref 3.5–5.3)
POTASSIUM SERPL-SCNC: 4 MMOL/L — SIGNIFICANT CHANGE UP (ref 3.5–5.3)
PROT SERPL-MCNC: 7.4 G/DL — SIGNIFICANT CHANGE UP (ref 6–8.3)
RBC # BLD: 3.46 M/UL — LOW (ref 3.8–5.2)
RBC # FLD: 13.7 % — SIGNIFICANT CHANGE UP (ref 10.3–14.5)
SODIUM SERPL-SCNC: 134 MMOL/L — LOW (ref 135–145)
WBC # BLD: 10.75 K/UL — HIGH (ref 3.8–10.5)
WBC # FLD AUTO: 10.75 K/UL — HIGH (ref 3.8–10.5)

## 2017-07-24 PROCEDURE — 82435 ASSAY OF BLOOD CHLORIDE: CPT

## 2017-07-24 PROCEDURE — 82330 ASSAY OF CALCIUM: CPT

## 2017-07-24 PROCEDURE — 83605 ASSAY OF LACTIC ACID: CPT

## 2017-07-24 PROCEDURE — 96374 THER/PROPH/DIAG INJ IV PUSH: CPT

## 2017-07-24 PROCEDURE — 85014 HEMATOCRIT: CPT

## 2017-07-24 PROCEDURE — 99285 EMERGENCY DEPT VISIT HI MDM: CPT | Mod: 25

## 2017-07-24 PROCEDURE — 87633 RESP VIRUS 12-25 TARGETS: CPT

## 2017-07-24 PROCEDURE — 94640 AIRWAY INHALATION TREATMENT: CPT

## 2017-07-24 PROCEDURE — 99239 HOSP IP/OBS DSCHRG MGMT >30: CPT

## 2017-07-24 PROCEDURE — 87581 M.PNEUMON DNA AMP PROBE: CPT

## 2017-07-24 PROCEDURE — 80053 COMPREHEN METABOLIC PANEL: CPT

## 2017-07-24 PROCEDURE — 71046 X-RAY EXAM CHEST 2 VIEWS: CPT

## 2017-07-24 PROCEDURE — 84100 ASSAY OF PHOSPHORUS: CPT

## 2017-07-24 PROCEDURE — 85027 COMPLETE CBC AUTOMATED: CPT

## 2017-07-24 PROCEDURE — 84132 ASSAY OF SERUM POTASSIUM: CPT

## 2017-07-24 PROCEDURE — 87086 URINE CULTURE/COLONY COUNT: CPT

## 2017-07-24 PROCEDURE — 96375 TX/PRO/DX INJ NEW DRUG ADDON: CPT

## 2017-07-24 PROCEDURE — 93005 ELECTROCARDIOGRAM TRACING: CPT

## 2017-07-24 PROCEDURE — 81001 URINALYSIS AUTO W/SCOPE: CPT

## 2017-07-24 PROCEDURE — 87040 BLOOD CULTURE FOR BACTERIA: CPT

## 2017-07-24 PROCEDURE — 83735 ASSAY OF MAGNESIUM: CPT

## 2017-07-24 PROCEDURE — 82947 ASSAY GLUCOSE BLOOD QUANT: CPT

## 2017-07-24 PROCEDURE — 87449 NOS EACH ORGANISM AG IA: CPT

## 2017-07-24 PROCEDURE — 87798 DETECT AGENT NOS DNA AMP: CPT

## 2017-07-24 PROCEDURE — 82803 BLOOD GASES ANY COMBINATION: CPT

## 2017-07-24 PROCEDURE — 84295 ASSAY OF SERUM SODIUM: CPT

## 2017-07-24 PROCEDURE — 87486 CHLMYD PNEUM DNA AMP PROBE: CPT

## 2017-07-24 RX ORDER — LATANOPROST 0.05 MG/ML
1 SOLUTION/ DROPS OPHTHALMIC; TOPICAL AT BEDTIME
Qty: 0 | Refills: 0 | Status: DISCONTINUED | OUTPATIENT
Start: 2017-07-24 | End: 2017-07-24

## 2017-07-24 RX ORDER — SERTRALINE 25 MG/1
100 TABLET, FILM COATED ORAL DAILY
Qty: 0 | Refills: 0 | Status: DISCONTINUED | OUTPATIENT
Start: 2017-07-24 | End: 2017-07-24

## 2017-07-24 RX ORDER — LEVOTHYROXINE SODIUM 125 MCG
50 TABLET ORAL DAILY
Qty: 0 | Refills: 0 | Status: DISCONTINUED | OUTPATIENT
Start: 2017-07-24 | End: 2017-07-24

## 2017-07-24 RX ORDER — ATORVASTATIN CALCIUM 80 MG/1
20 TABLET, FILM COATED ORAL AT BEDTIME
Qty: 0 | Refills: 0 | Status: DISCONTINUED | OUTPATIENT
Start: 2017-07-24 | End: 2017-07-24

## 2017-07-24 RX ORDER — ATORVASTATIN CALCIUM 80 MG/1
1 TABLET, FILM COATED ORAL
Qty: 0 | Refills: 0 | COMMUNITY

## 2017-07-24 RX ORDER — DORZOLAMIDE HYDROCHLORIDE TIMOLOL MALEATE 20; 5 MG/ML; MG/ML
1 SOLUTION/ DROPS OPHTHALMIC
Qty: 0 | Refills: 0 | Status: DISCONTINUED | OUTPATIENT
Start: 2017-07-24 | End: 2017-07-24

## 2017-07-24 RX ORDER — ASPIRIN/CALCIUM CARB/MAGNESIUM 324 MG
81 TABLET ORAL DAILY
Qty: 0 | Refills: 0 | Status: DISCONTINUED | OUTPATIENT
Start: 2017-07-24 | End: 2017-07-24

## 2017-07-24 RX ADMIN — PANTOPRAZOLE SODIUM 40 MILLIGRAM(S): 20 TABLET, DELAYED RELEASE ORAL at 06:42

## 2017-07-24 RX ADMIN — HEPARIN SODIUM 5000 UNIT(S): 5000 INJECTION INTRAVENOUS; SUBCUTANEOUS at 13:25

## 2017-07-24 RX ADMIN — Medication 3 MILLILITER(S): at 11:40

## 2017-07-24 RX ADMIN — HEPARIN SODIUM 5000 UNIT(S): 5000 INJECTION INTRAVENOUS; SUBCUTANEOUS at 06:42

## 2017-07-24 NOTE — PROGRESS NOTE ADULT - PROBLEM SELECTOR PLAN 5
, Transaminitis resolving, AST 49-->37, ALT 63-->41, likely secondary to infection  - Alk phos remains elevated, 238, will follow-up as outpt  - Will continue atorvastatin

## 2017-07-24 NOTE — PROGRESS NOTE ADULT - PROBLEM SELECTOR PLAN 2
- 2 day history of cough with associated fever and nausea, likely viral bronchitis with +Entero/Rhino and absence of consolidation on chest x-ray  - Duoneb PRN q6h for wheezing or shortness of breath  - Will need nebulizer for home  - supportive care as needed

## 2017-07-24 NOTE — CONSULT NOTE ADULT - SUBJECTIVE AND OBJECTIVE BOX
Patient is a 81y old  Female who presents with a chief complaint of Cough  for several days prior to admission      HPI:  81 year old white female, well known to our practice,with PMH of thyroid disease, back pain (s/p laminotomy 2014), H. Pylori, and depression admitted with a several  day history of cough and subjective fever. The patient returned yesterday morning from a 2 week trip to [place] . The patient reports she was in her normal state of health until Thursday morning (7/20) when she developed a cough. She describes the cough as non-productive, but feeling like she needs to cough something up. She also experienced a decreased appetite. On Friday morning, the patient's cough worsened  and also experienced severe nausea. The patient  experienced nausea on plane, vomiting 6 times during flight, each time productive with mucus and no blood. The patient has eaten little since Thursday. The patient reports chest pain experienced only with coughing. Patient also endorses a mild headache. Denies any chest pain at rest or shortness of breath. The patient denies any abdominal pain,  or diarrhea. The patient encountered one sick contact, her sister, with a dx of malignancy, who had a cough, on Sunday 7/16. Denies any contact with animals.hx of chronic constipation and utilizes percocet prn. no prior hx of elevated lft's.hospital course with chest x ray negative for pna and rvp panel positive for enetro/rhino virus.      PAST MEDICAL & SURGICAL HISTORY:  Glaucoma  H pylori ulcer  Thyroid disease  Depression  Back pain  Anxiety  HLD (hyperlipidemia)  Thyroid nodule  past surgical hx:  History of laminectomy: 2013      Allergies  No Known Allergies      MEDICATIONS  (STANDING):  pantoprazole    Tablet 40 milliGRAM(s) Oral before breakfast  heparin  Injectable 5000 Unit(s) SubCutaneous every 8 hours  sodium chloride 0.9%. 1000 milliLiter(s) (50 mL/Hr) IV Continuous <Continuous>  senna 1 Tablet(s) Oral at bedtime  polyethylene glycol 3350 17 Gram(s) Oral daily    MEDICATIONS  (PRN):  acetaminophen   Tablet 650 milliGRAM(s) Oral every 6 hours PRN For Temp greater than 38 C (100.4 F)  ondansetron Injectable 4 milliGRAM(s) IV Push every 8 hours PRN Nausea and/or Vomiting  oxyCODONE    5 mG/acetaminophen 325 mG 1 Tablet(s) Oral every 6 hours PRN Moderate Pain (4 - 6)  ALBUTerol/ipratropium for Nebulization 3 milliLiter(s) Nebulizer every 6 hours PRN Shortness of Breath and/or Wheezing      social history    FAMILY HISTORY:  Family history of stomach cancer (Sibling)  Family history of coronary artery disease (Sibling)  Family history of non-Hodgkin's lymphoma (Sibling): Sister              Vital Signs Last 24 Hrs  T(C): 36.5 (24 Jul 2017 06:23), Max: 37.1 (23 Jul 2017 08:19)  T(F): 97.7 (24 Jul 2017 06:23), Max: 98.7 (23 Jul 2017 08:19)  HR: 85 (24 Jul 2017 06:23) (61 - 90)  BP: 129/83 (24 Jul 2017 06:23) (110/72 - 135/73)  BP(mean): --  RR: 18 (24 Jul 2017 06:23) (16 - 18)  SpO2: 95% (24 Jul 2017 06:23) (91% - 99%)        LABS:                        10.5   11.23 )-----------( 225      ( 23 Jul 2017 09:13 )             31.2     07-23    134<L>  |  97  |  8   ----------------------------<  86  3.8   |  23  |  0.67    Ca    8.7      23 Jul 2017 09:06  Phos  2.4     07-23  Mg     2.0     07-23    TPro  6.8  /  Alb  3.4  /  TBili  0.4  /  DBili  x   /  AST  37  /  ALT  41  /  AlkPhos  238<H>  07-23        I&O's Summary    23 Jul 2017 07:01  -  24 Jul 2017 07:00  --------------------------------------------------------  IN: 590 mL / OUT: 0 mL / NET: 590 mL      RADIOLOGY & ADDITIONAL STUDIES:        [facility]

## 2017-07-24 NOTE — DISCHARGE NOTE ADULT - CARE PROVIDER_API CALL
Piter Armando), Gastroenterology; Internal Medicine  [address] [place] [address]  Phone: [phone]  Fax: [phone]

## 2017-07-24 NOTE — PROGRESS NOTE ADULT - PROBLEM SELECTOR PLAN 9
-PT consulted-will follow up recs
-PT consulted-will follow up recs, patient requests to go home
-PT consulted-will follow up recs

## 2017-07-24 NOTE — PROGRESS NOTE ADULT - PROBLEM SELECTOR PLAN 4
- Pt had no N/V overnight  - Continue with zofran 4 mg IV q8h prn  - Continue with regular diet, advance as tolerated

## 2017-07-24 NOTE — PROGRESS NOTE ADULT - PROBLEM SELECTOR PLAN 1
- Patient WBC 11.9, , T 100.2 in ED, met sepsis criteria, likely in setting of +Entero/Rhinovirus viral bronchitis, sepsis resolved  - Legionella antigen negative  - Blood and urine cx (7/22) no growth to date

## 2017-07-24 NOTE — CONSULT NOTE ADULT - GASTROINTESTINAL DETAILS
nontender/bowel sounds normal/no masses palpable/soft/no guarding/no rebound tenderness/no rigidity/no distention

## 2017-07-24 NOTE — DISCHARGE NOTE ADULT - CARE PLAN
Principal Discharge DX:	Viral bronchitis  Secondary Diagnosis:	History of laminectomy  Secondary Diagnosis:	Hyponatremia  Secondary Diagnosis:	Hyperlipidemia  Secondary Diagnosis:	Elevated liver enzymes Principal Discharge DX:	Viral bronchitis  Goal:	Resolution of viral infection  Instructions for follow-up, activity and diet:	You were admitted with Rhino/Enterovirus viral bronchitis. Your condition improved on supportive care with IV fluids 50 cc/hr. Continue to increase your intake of liquids and solids. Please follow up with your PMD Dr. Armando within 1-2 weeks.  Secondary Diagnosis:	History of laminectomy  Goal:	Maintain pain control  Instructions for follow-up, activity and diet:	You were previously prescribed oxycodone/tylenol (percocet) as needed. Please follow up with your PMD Dr. Armando within 1-2 weeks. Continue your home medications as needed.  Secondary Diagnosis:	Hyperlipidemia  Goal:	Maintain LDL < 100  Instructions for follow-up, activity and diet:	You were previously taking atorvastatin for hyperlipidemia. This medication was stopped during your hospital stay due to elevated liver enzymes. Please continue taking atorvastatin as outpatient.  Secondary Diagnosis:	Hypothyroidism  Goal:	Follow up with PMD for further evaluation  Instructions for follow-up, activity and diet:	In the hospital, bloodwork revealed that your liver enzymes were elevated. Please follow up with your PMD Dr. Armando within 1-2 weeks. Continue with your home medications.  Secondary Diagnosis:	H pylori ulcer  Goal:	Prevention of duodenal ulcers  Instructions for follow-up, activity and diet:	Please follow up with your PMD, Dr. Armando. Avoid NSAIDS at home. Principal Discharge DX:	Viral bronchitis  Goal:	Resolution of viral infection  Instructions for follow-up, activity and diet:	You were admitted with Rhino/Enterovirus viral bronchitis. Your condition improved on supportive care with IV fluids and supplemental oxygen. Continue to increase your intake of liquids and solids. Please follow up with your PMD Dr. Armando within 1-2 weeks.  Secondary Diagnosis:	History of laminectomy  Goal:	Maintain pain control  Instructions for follow-up, activity and diet:	You were previously prescribed oxycodone/tylenol (percocet) as needed. Please follow up with your PMD Dr. Armando within 1-2 weeks. Continue your home medications as needed.  Secondary Diagnosis:	Hyperlipidemia  Goal:	Maintain LDL < 100  Instructions for follow-up, activity and diet:	You were previously taking atorvastatin for hyperlipidemia. This medication was stopped during your hospital stay due to elevated liver enzymes. Please continue taking atorvastatin at home.  Secondary Diagnosis:	Hypothyroidism  Goal:	Follow up with PMD for further evaluation  Instructions for follow-up, activity and diet:	Please follow up with your PMD Dr. Armando within 1-2 weeks. Continue with your home dose of levothyroxine.  Secondary Diagnosis:	Glaucoma  Goal:	Prevention of worsening of condition  Instructions for follow-up, activity and diet:	Continue taking home medications for glaucoma.  Secondary Diagnosis:	Depression  Goal:	Prevention of a worsening of depression  Instructions for follow-up, activity and diet:	Please follow up with your PMD Dr. Armando within 1-2 weeks. Continue taking your home dose of zoloft. Principal Discharge DX:	Viral bronchitis  Goal:	Resolution of viral infection  Instructions for follow-up, activity and diet:	You were admitted with Rhino/Enterovirus viral bronchitis. Your condition improved on supportive care with IV fluids and supplemental oxygen. Continue to increase your intake of liquids and solids. Please follow up with your PMD Dr. Armando within 1-2 weeks of discharge.  Secondary Diagnosis:	History of laminectomy  Goal:	Maintain pain control  Instructions for follow-up, activity and diet:	You were previously prescribed oxycodone/tylenol (percocet) as needed. Please follow up with your PMD Dr. Armando within 1-2 weeks. Continue your home medications as needed.  Secondary Diagnosis:	Hyperlipidemia  Goal:	Maintain LDL < 100  Instructions for follow-up, activity and diet:	You were previously taking atorvastatin for hyperlipidemia. This medication was stopped during your hospital stay due to elevated liver enzymes. Please continue taking atorvastatin at home.  Secondary Diagnosis:	Hypothyroidism  Goal:	Follow up with PMD for further evaluation  Instructions for follow-up, activity and diet:	Please follow up with your PMD Dr. Armando within 1-2 weeks. Continue with your home dose of levothyroxine.  Secondary Diagnosis:	Transaminitis  Goal:	PLEASE HOLD OFF ON TAKING YOUR ATORVASTATIN  Instructions for follow-up, activity and diet:	Your liver enzymes were high, Dr. Armando is going to see outpatient and repeat labs to see how your liver enzymes are doing. Please discontinue taking atorvastatin as this medication can increase your liver enzymes. Dr. Armando will follow this up outpatient.  Goal:	Prevention of a worsening of depression  Instructions for follow-up, activity and diet:	Please follow up with your PMD Dr. Armando within 1-2 weeks. Continue taking your home dose of zoloft. Principal Discharge DX:	Viral bronchitis  Goal:	Resolution of viral infection  Instructions for follow-up, activity and diet:	You were admitted with Rhino/Enterovirus viral bronchitis. Your condition improved on supportive care with IV fluids and supplemental oxygen. Continue to increase your intake of liquids and solids. Please follow up with your PMD Dr. Armando within 1-2 weeks of discharge.  Secondary Diagnosis:	History of laminectomy  Goal:	Maintain pain control  Instructions for follow-up, activity and diet:	You were previously prescribed oxycodone/tylenol (percocet) as needed. Please follow up with your PMD Dr. Armando within 1-2 weeks. Continue your home medications as needed.  Secondary Diagnosis:	Hyperlipidemia  Goal:	Maintain LDL < 100  Instructions for follow-up, activity and diet:	You were previously taking atorvastatin for hyperlipidemia. This medication was stopped during your hospital stay due to elevated liver enzymes. Please stop taking atorvastatin at home and follow up with PMD for repeat labs.  Secondary Diagnosis:	Hypothyroidism  Goal:	Follow up with PMD for further evaluation  Instructions for follow-up, activity and diet:	Please follow up with your PMD Dr. Armando within 1-2 weeks. Continue with your home dose of levothyroxine.  Secondary Diagnosis:	Transaminitis  Goal:	PLEASE HOLD OFF ON TAKING YOUR ATORVASTATIN  Instructions for follow-up, activity and diet:	Your liver enzymes were high, Dr. Armando is going to see outpatient and repeat labs to see how your liver enzymes are doing. Please discontinue taking atorvastatin as this medication can increase your liver enzymes. Dr. Armando will follow this up outpatient.  Goal:	Prevention of a worsening of depression  Instructions for follow-up, activity and diet:	Please follow up with your PMD Dr. Armando within 1-2 weeks. Continue taking your home dose of zoloft.

## 2017-07-24 NOTE — PROGRESS NOTE ADULT - PROBLEM SELECTOR PLAN 2
- 2 day history of cough with associated fever and nausea, likely viral bronchitis with +Entero/Rhino and absence of consolidation on chest x-ray  - Duoneb PRN q6h for wheezing or shortness of breath  - Will need nebulizer for home  - supportive care as needed - 2 day history of cough with associated fever and nausea, likely viral bronchitis with +Entero/Rhino and absence of consolidation on chest x-ray  - Duoneb PRN q6h for wheezing or shortness of breath  - supportive care as needed

## 2017-07-24 NOTE — PROGRESS NOTE ADULT - PROBLEM SELECTOR PLAN 5
, Transaminitis resolving, AST 49-->37, ALT 63-->41, likely secondary to infection  - Alk phos remains elevated, 238, will follow-up as outpt, may need ultrasound outpatient  - Will continue atorvastatin

## 2017-07-24 NOTE — DISCHARGE NOTE ADULT - MEDICATION SUMMARY - MEDICATIONS TO TAKE
I will START or STAY ON the medications listed below when I get home from the hospital:    Percocet 5/325 oral tablet  -- 0.5 tab(s) by mouth once a day, As Needed  -- Indication: For Chronic Pain    aspirin 81 mg oral tablet  -- 1 tab(s) by mouth every other day   -- Indication: For CAD    Zoloft 100 mg oral tablet  -- 1 tab(s) by mouth once a day  -- Indication: For Depression/Anxiety    Colace 100 mg oral capsule  -- 3 cap(s) by mouth once a day (at bedtime)  -- Indication: For Constipation prn    Xalatan 0.005% ophthalmic solution  -- 1 drop(s) to each affected eye once a day (in the evening)  -- Indication: For Glaucoma    dorzolamide-timolol 2.23%-0.68% ophthalmic solution  -- 1 drop(s) to each eye 2 times a day  -- Indication: For Glaucoma    PriLOSEC 40 mg oral delayed release capsule  -- 1 cap(s) by mouth once a day  -- Indication: For reflux    levothyroxine 50 mcg (0.05 mg) oral tablet  -- 1 tab(s) by mouth once a day  -- Indication: For Hypothyroidism

## 2017-07-24 NOTE — CONSULT NOTE ADULT - ASSESSMENT
alk phos remains mildly elevated with normalization of transaminases   trend cmp   consider sono abdomen which may be performed as outpatient  lipitor held  ? lft elevation secondary to current viral illness  follow up instructions given

## 2017-07-24 NOTE — DISCHARGE NOTE ADULT - HOSPITAL COURSE
81 year old F patient with PMH of hypothyroidism (s/p thyroid nodule removal), back pain (s/p laminotomy 2014), H. Pylori, and depression admitted with a 2 day history of cough and subjective fever. The patient returned yesterday morning from a 2 week trip to [employer]. The patient reports she was in her normal state of health until Thursday morning (7/20) when she developed a cough. She described the cough bothered her the entire day and worsened. She describes the cough as non-productive, but feeling like she needs to cough something up. She also experienced a decreased appetite. On Friday morning, the patient's cough worsened and "became full-blown," and also experienced severe nausea. The patient boarded a 6 hour flight from [place] to [place] and experienced nausea on plane, vomiting 6 times during flight, each time productive with mucus and no blood. The patient has eaten little since Thursday, last meal was a few sips of soup Friday afternoon. The patient reports chest pain experienced only with coughing. Patient also endorses a mild headache. Denies any chest pain at rest or shortness of breath. The patient denies any abdominal pain, constipation, or diarrhea. The patient encountered one sick contact, her sister who had a cough, on Sunday 7/16. Denies any contact with animals.    Vitals in ED: . Patient received 500 mL LR bolus, treated for antibiotics    Following admission 81 year old F patient with PMH of hypothyroidism (s/p thyroid nodule removal), back pain (s/p laminotomy 2014), H. Pylori, and depression admitted with a 2 day history of cough and subjective fever. The patient reports she was in her normal state of health until Thursday morning (7/20) when she developed a cough and decreased PO intake. On Friday (7/21) morning, the patient's cough worsened and "became full-blown," and also experienced severe nausea. The patient boarded a 6 hour flight from [place] to [place] and experienced nausea on plane, vomiting 6 times during flight, each time productive with mucus and no blood The patient denies any abdominal pain, constipation, or diarrhea.    Vitals in ED: T 37.9, , /82, RR 21, spo2 95 on RA. O2 supplementation w/ NC 2 lpm was started, patient spO2 well on RA. Patient received 500 mL NS bolus, treated with azithromycin 500 mg IV, ceftriaxone 1 g IV, received 1x zofran 4 mg for nausea.    Following admission, entero/rhinovirus+ RVP, CXR clear lungs, suggested likely viral bronchitis and the antibiotics were discontinued. Patient continued to have nausea and decreased PO intake. Nausea improved with 1 x dose reglan. IV fluids were continued NS 50 cc/hr. Patient remained afebrile since day of admission. On 7/23, patient reported worsening of cough with worsening chest pain associated with cough. Patient received few Duoneb treatments PRN. On 7/24, the patient's cough significantly improved, nausea had resolved, and patient was weaned off of nasal cannula, sat well on room air. On physical exam, there was still diffuse wheezing and rhonchi on auscultation with improvement. Patient is tolerating solids and liquids. WBC trending down from 11.9 to 10.5. Blood and urine cultures (7/21) reveal no growth to date.    Patient reports she is feeling much better and feels prepared for discharge. Patient was encouraged to continue with increasing fluid intake. Patient is independent with ADLs and ambulates without assistance.    Bloodwork revealed Elevated AST/ALT 49/63 and Alk Phos 261. Home medication atorvastatin was held. Transaminitis resolved during hospital course, but Alk phos remains elevated at 238. Patient's PMD/Gastroenterologist Dr. Ackert consulted, will follow-up elevated alk phos as outpt. 81 year old F patient with PMH of hypothyroidism (s/p thyroid nodule removal), back pain (s/p laminotomy 2014), H. Pylori, and depression admitted with a 2 day history of cough and subjective fever. The patient reports she was in her normal state of health until Thursday morning (7/20) when she developed a cough and decreased PO intake. On Friday (7/21) morning, the patient's cough worsened and "became full-blown," and also experienced severe nausea. The patient boarded a 6 hour flight from [place] to [place] and experienced nausea on plane, vomiting 6 times during flight, each time productive with mucus without blood. The patient denies any shortness of breath, chest pain with exertion, abdominal pain, constipation, or diarrhea.    Vitals in ED: T 37.9, , /82, RR 21, spo2 95 on RA. O2 supplementation w/ NC 2 lpm was started, patient spO2 well on RA. Patient received 500 mL NS bolus, treated with azithromycin 500 mg IV, ceftriaxone 1 g IV, received 1x zofran 4 mg for nausea.    Following admission, entero/rhinovirus+ RVP, CXR clear lungs, suggested likely viral bronchitis and the antibiotics were discontinued. Patient continued to have nausea and decreased PO intake. Nausea improved with 1 x dose reglan. IV fluids were continued NS 50 cc/hr. Patient remained afebrile since day of admission. On 7/23, patient reported worsening of cough with worsening chest pain associated with cough. Patient received few Duoneb treatments PRN. On 7/24, the patient's cough significantly improved, but still present. Nausea had resolved, and patient was weaned off of nasal cannula, sat well on room air. On physical exam, there was still diffuse wheezing and rhonchi on auscultation with improvement. Patient is tolerating solids and liquids. WBC trending down from 11.9 to 10.5. Blood and urine cultures (7/21) reveal no growth to date.    Patient reports she is feeling much better and feels prepared for discharge. Patient was encouraged to continue with increasing fluid intake. Patient is independent with ADLs and ambulates without assistance.    Bloodwork revealed Elevated AST/ALT 49/63 and Alk Phos 261. Home medication atorvastatin was held. Transaminitis resolved during hospital course, but Alk phos remains elevated at 238. Patient's PMD/Gastroenterologist Dr. Armando consulted, will follow-up elevated alk phos as outpt.

## 2017-07-24 NOTE — DISCHARGE NOTE ADULT - PATIENT PORTAL LINK FT
“You can access the [facility] Patient Portal, offered by [facility], by registering with the following website: [email or web address]”

## 2017-07-24 NOTE — DISCHARGE NOTE ADULT - MEDICATION SUMMARY - MEDICATIONS TO STOP TAKING
I will STOP taking the medications listed below when I get home from the hospital:    atorvastatin 20 mg oral tablet  -- 1 tab(s) by mouth once a day    Ativan 1 mg oral tablet  -- 1 tab(s) by mouth once a day, As Needed

## 2017-07-24 NOTE — DISCHARGE NOTE ADULT - PLAN OF CARE
Resolution of viral infection You were admitted with Rhino/Enterovirus viral bronchitis. Your condition improved on supportive care with IV fluids 50 cc/hr. Continue to increase your intake of liquids and solids. Please follow up with your PMD Dr. Armando within 1-2 weeks. Maintain pain control You were previously prescribed oxycodone/tylenol (percocet) as needed. Please follow up with your PMD Dr. Armando within 1-2 weeks. Continue your home medications as needed. You were previously taking atorvastatin for hyperlipidemia. This medication was stopped during your hospital stay due to elevated liver enzymes. Please continue taking atorvastatin as outpatient. In the hospital, bloodwork revealed that your liver enzymes were elevated. Please follow up with your PMD Dr. Armando within 1-2 weeks. Continue with your home medications. Follow up with PMD for further evaluation Maintain LDL < 100 Prevention of duodenal ulcers Please follow up with your PMD, Dr. Armando. Avoid NSAIDS at home. Prevention of worsening of condition Continue taking home medications for glaucoma. You were admitted with Rhino/Enterovirus viral bronchitis. Your condition improved on supportive care with IV fluids and supplemental oxygen. Continue to increase your intake of liquids and solids. Please follow up with your PMD Dr. Armando within 1-2 weeks. You were previously taking atorvastatin for hyperlipidemia. This medication was stopped during your hospital stay due to elevated liver enzymes. Please continue taking atorvastatin at home. Please follow up with your PMD Dr. Armando within 1-2 weeks. Continue with your home dose of levothyroxine. Prevention of a worsening of depression Please follow up with your PMD Dr. Armando within 1-2 weeks. Continue taking your home dose of zoloft. PLEASE HOLD OFF ON TAKING YOUR ATORVASTATIN Your liver enzymes were high, Dr. Armando is going to see outpatient and repeat labs to see how your liver enzymes are doing. Please discontinue taking atorvastatin as this medication can increase your liver enzymes. Dr. Armando will follow this up outpatient. You were admitted with Rhino/Enterovirus viral bronchitis. Your condition improved on supportive care with IV fluids and supplemental oxygen. Continue to increase your intake of liquids and solids. Please follow up with your PMD Dr. Armando within 1-2 weeks of discharge. You were previously taking atorvastatin for hyperlipidemia. This medication was stopped during your hospital stay due to elevated liver enzymes. Please stop taking atorvastatin at home and follow up with PMD for repeat labs.

## 2017-07-24 NOTE — PHYSICAL THERAPY INITIAL EVALUATION ADULT - PRECAUTIONS/LIMITATIONS, REHAB EVAL
81 year old F patient with PMH of thyroid disease, back pain (s/p laminotomy 2014), H. Pylori, and depression admitted with a 2 day history of cough and subjective fever. The patient returned yesterday morning from a 2 week trip to [place] [employer]. The patient reports she was in her normal state of health until Thursday morning (7/20) when she developed a cough. She described the cough bothered her the entire day and worsened. She describes the cough as non-productive, but feeling like she needs to cough something up. She also experienced a decreased appetite. On Friday morning, the patient's cough worsened and "became full-blown," and also experienced severe nausea. The patient boarded a 6 hour flight from [place] to [place] and experienced nausea on plane, vomiting 6 times during flight, each time productive with mucus and no blood. The patient has eaten little since Thursday, last meal was a few sips of soup Friday afternoon. The patient reports chest pain experienced only with coughing. Patient also endorses a mild headache. Denies any chest pain at rest or shortness of breath. The patient denies any abdominal pain, constipation, or diarrhea. The patient encountered one sick contact, her sister who had a cough, on Sunday 7/16. Denies any contact with animals.

## 2017-07-24 NOTE — PROGRESS NOTE ADULT - SUBJECTIVE AND OBJECTIVE BOX
Patient is a 81y old  Female who presents with a chief complaint of Cough (2017 08:33)      SUBJECTIVE / OVERNIGHT EVENTS: Patient yesterday experienced persistent nausea after a dose of 1x zofran. Given 1x reglan yesterday evening and nausea improved. Patient reports that her cough has worsened, and last night is the worst she has felt since the start of her illness. She did not sleep well and spent most of the night seated in the chair. The cough remains non-productive. She endorses mild chills, but denies having a fever or night sweats. She is experiencing worsening pain in her chest when coughing. Her appetite has not improved. She did not eat all day yesterday, currently trying PO liquids. Remained on O2 NC 3 lpm O/N, spO2 94-97%. Denies any vomiting or abdominal pain. Denies feeling short of breath.        MEDICATIONS  (STANDING):  pantoprazole    Tablet 40 milliGRAM(s) Oral before breakfast  sodium chloride 0.9%. 1000 milliLiter(s) (50 mL/Hr) IV Continuous <Continuous>    MEDICATIONS  (PRN):  acetaminophen   Tablet 650 milliGRAM(s) Oral every 6 hours PRN For Temp greater than 38 C (100.4 F)  ondansetron Injectable 4 milliGRAM(s) IV Push every 8 hours PRN Nausea and/or Vomiting  oxyCODONE    5 mG/acetaminophen 325 mG 1 Tablet(s) Oral every 6 hours PRN Moderate Pain (4 - 6)  ALBUTerol/ipratropium for Nebulization 3 milliLiter(s) Nebulizer every 6 hours PRN Shortness of Breath and/or Wheezing        CAPILLARY BLOOD GLUCOSE        I&O's Summary    2017 07:01  -  2017 07:00  --------------------------------------------------------  IN: 50 mL / OUT: 0 mL / NET: 50 mL        PHYSICAL EXAM:  GENERAL: NAD  HEAD:  Atraumatic, pharynx non-erythematous  EYES: PERRLA, EOMI, conjunctiva and sclera clear  NECK: Supple, No JVD, mild cervical lymphadenopathy  CHEST/LUNG: Diffuse wheezes and rhonchi throughout b/l, good inspiratory effort  HEART: Regular rate and rhythm; No murmurs, rubs, or gallops  ABDOMEN: Soft, Nontender, Nondistended; Bowel sounds present  EXTREMITIES:  2+ Peripheral Pulses, 1+ pitting edema in LE b/l  PSYCH: AAOx3  NEUROLOGY: non-focal  SKIN: No rashes    LABS:                        12.4   11.9  )-----------( 232      ( 2017 03:12 )             35.6     WBC Trend: 11.9<--      131<L>  |  92<L>  |  13  ----------------------------<  112<H>  3.7   |  24  |  0.81    Ca    8.9      2017 03:12    TPro  7.7  /  Alb  4.0  /  TBili  0.5  /  DBili  x   /  AST  49<H>  /  ALT  63<H>  /  AlkPhos  251<H>      Creatinine Trend: 0.81<--        Urinalysis Basic - ( 2017 06:46 )    Color: Yellow / Appearance: x / S.029 / pH: x  Gluc: x / Ketone: Trace  / Bili: Negative / Urobili: Negative   Blood: x / Protein: 100 mg/dL / Nitrite: Negative   Leuk Esterase: Negative / RBC: 3-5 /HPF / WBC 3-5 /HPF   Sq Epi: x / Non Sq Epi: x / Bacteria: Few /HPF          RADIOLOGY & ADDITIONAL TESTS:    Chest X-ray:    FINDINGS:    The lungs are clear.  There is no pleural effusion or pneumothorax.  The cardiomediastinal silhouette is within normal limits.  The visualized osseous and soft tissue structures demonstrate no acute   pathology.    IMPRESSION:     Clear lungs.    Imaging Personally Reviewed:    Consultant(s) Notes Reviewed:      Care Discussed with Consultants/Other Providers:
Patient is a 81y old  Female who presents with a chief complaint of Cough (2017 08:33)      SUBJECTIVE / OVERNIGHT EVENTS:  Patient reports that her cough has worsened, and last night is the worst she has felt since the start of her illness. She did not sleep well.  She endorses mild chills, but denies having a fever or night sweats. She is experiencing worsening pain in her chest when coughing. Her appetite has not improved. She did not eat all day yesterday, currently trying PO liquids. Remained on O2 NC 3 lpm O/N, spO2 94-97%. Denies any vomiting or abdominal pain. Denies feeling short of breath.    MEDICATIONS  (STANDING):  pantoprazole    Tablet 40 milliGRAM(s) Oral before breakfast  sodium chloride 0.9%. 1000 milliLiter(s) (50 mL/Hr) IV Continuous <Continuous>    MEDICATIONS  (PRN):  acetaminophen   Tablet 650 milliGRAM(s) Oral every 6 hours PRN For Temp greater than 38 C (100.4 F)  ondansetron Injectable 4 milliGRAM(s) IV Push every 8 hours PRN Nausea and/or Vomiting  oxyCODONE    5 mG/acetaminophen 325 mG 1 Tablet(s) Oral every 6 hours PRN Moderate Pain (4 - 6)  ALBUTerol/ipratropium for Nebulization 3 milliLiter(s) Nebulizer every 6 hours PRN Shortness of Breath and/or Wheezing        CAPILLARY BLOOD GLUCOSE        I&O's Summary    2017 07:01  -  2017 07:00  --------------------------------------------------------  IN: 50 mL / OUT: 0 mL / NET: 50 mL      PHYSICAL EXAM:  GENERAL: NAD  HEAD:  Atraumatic, pharynx non-erythematous  EYES:  conjunctiva and sclera clear  NECK: Supple, No JVD, mild cervical lymphadenopathy  CHEST/LUNG: Diffuse wheezes and rhonchi throughout b/l, good inspiratory effort  HEART: Regular rate and rhythm; No murmurs, rubs, or gallops  ABDOMEN: Soft, Nontender, Nondistended; Bowel sounds present  EXTREMITIES:  2+ Peripheral Pulses, 1+ pitting edema in LE b/l  PSYCH: AAOx3  SKIN: No rashes      LABS:                        12.4   11.9  )-----------( 232      ( 2017 03:12 )             35.6     07-    131<L>  |  92<L>  |  13  ----------------------------<  112<H>  3.7   |  24  |  0.81    Ca    8.9      2017 03:12    TPro  7.7  /  Alb  4.0  /  TBili  0.5  /  DBili  x   /  AST  49<H>  /  ALT  63<H>  /  AlkPhos  251<H>            Urinalysis Basic - ( 2017 06:46 )    Color: Yellow / Appearance: x / S.029 / pH: x  Gluc: x / Ketone: Trace  / Bili: Negative / Urobili: Negative   Blood: x / Protein: 100 mg/dL / Nitrite: Negative   Leuk Esterase: Negative / RBC: 3-5 /HPF / WBC 3-5 /HPF   Sq Epi: x / Non Sq Epi: x / Bacteria: Few /HPF        RADIOLOGY & ADDITIONAL TESTS:    Imaging Personally Reviewed:    Consultant(s) Notes Reviewed:      Care Discussed with Consultants/Other Providers:
Patient is a 81y old  Female who presents with a chief complaint of Cough (22 Jul 2017 08:33)      SUBJECTIVE / OVERNIGHT EVENTS:  Patient reports improvement in cough and energy last night. Patient still has cough that has remained non-productive. Given 1x Duoneb last night, which patient reports helped with breathing. Weaned off of supp O2 NC yesterday evening, spO2 94-97 on room air. Patient has increased PO intake to liquids and had soup yesterday, feels ready for discharge. denies any shortness of breath, N/V, or abdominal pain.      MEDICATIONS  (STANDING):  pantoprazole    Tablet 40 milliGRAM(s) Oral before breakfast  heparin  Injectable 5000 Unit(s) SubCutaneous every 8 hours  sodium chloride 0.9%. 1000 milliLiter(s) (50 mL/Hr) IV Continuous <Continuous>  senna 1 Tablet(s) Oral at bedtime  polyethylene glycol 3350 17 Gram(s) Oral daily    MEDICATIONS  (PRN):  acetaminophen   Tablet 650 milliGRAM(s) Oral every 6 hours PRN For Temp greater than 38 C (100.4 F)  ondansetron Injectable 4 milliGRAM(s) IV Push every 8 hours PRN Nausea and/or Vomiting  oxyCODONE    5 mG/acetaminophen 325 mG 1 Tablet(s) Oral every 6 hours PRN Moderate Pain (4 - 6)  ALBUTerol/ipratropium for Nebulization 3 milliLiter(s) Nebulizer every 6 hours PRN Shortness of Breath and/or Wheezing        CAPILLARY BLOOD GLUCOSE        I&O's Summary    23 Jul 2017 07:01  -  24 Jul 2017 07:00  --------------------------------------------------------  IN: 1070 mL / OUT: 0 mL / NET: 1070 mL        PHYSICAL EXAM:  GENERAL: NAD  HEAD:  Atraumatic, pharynx non-erythematous  EYES:  conjunctiva and sclera clear  NECK: Supple, No JVD, mild cervical lymphadenopathy  CHEST/LUNG: Diffuse wheezes and rhonchi throughout b/l, good inspiratory effort  HEART: Regular rate and rhythm; No murmurs, rubs, or gallops  ABDOMEN: Soft, Nontender, Nondistended; Bowel sounds present  EXTREMITIES:  2+ Peripheral Pulses, 1+ pitting edema in LE b/l    LABS:                        10.5   11.23 )-----------( 225      ( 23 Jul 2017 09:13 )             31.2     07-23    134<L>  |  97  |  8   ----------------------------<  86  3.8   |  23  |  0.67    Ca    8.7      23 Jul 2017 09:06  Phos  2.4     07-23  Mg     2.0     07-23    TPro  6.8  /  Alb  3.4  /  TBili  0.4  /  DBili  x   /  AST  37  /  ALT  41  /  AlkPhos  238<H>  07-23              RADIOLOGY & ADDITIONAL TESTS:    Imaging Personally Reviewed:    Consultant(s) Notes Reviewed:      Care Discussed with Consultants/Other Providers:
Patient is a 81y old  Female who presents with a chief complaint of Cough (22 Jul 2017 08:33)      SUBJECTIVE / OVERNIGHT EVENTS: Patient reports improvement in cough and energy last night. Patient still has cough that has remained non-productive. Given 1x Duoneb last night, which patient reports helped with breathing. Weaned off of supp O2 NC yesterday evening, spO2 94-97 on room air. Patient has increased PO intake to liquids and had soup yesterday, feels ready for discharge. Had 1x BM this morning, denies any shortness of breath, N/V, or abdominal pain.    MEDICATIONS  (STANDING):  pantoprazole    Tablet 40 milliGRAM(s) Oral before breakfast  heparin  Injectable 5000 Unit(s) SubCutaneous every 8 hours  sodium chloride 0.9%. 1000 milliLiter(s) (50 mL/Hr) IV Continuous <Continuous>  senna 1 Tablet(s) Oral at bedtime  polyethylene glycol 3350 17 Gram(s) Oral daily    MEDICATIONS  (PRN):  acetaminophen   Tablet 650 milliGRAM(s) Oral every 6 hours PRN For Temp greater than 38 C (100.4 F)  ondansetron Injectable 4 milliGRAM(s) IV Push every 8 hours PRN Nausea and/or Vomiting  oxyCODONE    5 mG/acetaminophen 325 mG 1 Tablet(s) Oral every 6 hours PRN Moderate Pain (4 - 6)  ALBUTerol/ipratropium for Nebulization 3 milliLiter(s) Nebulizer every 6 hours PRN Shortness of Breath and/or Wheezing        CAPILLARY BLOOD GLUCOSE        I&O's Summary    23 Jul 2017 07:01  -  24 Jul 2017 07:00  --------------------------------------------------------  IN: 590 mL / OUT: 0 mL / NET: 590 mL        PHYSICAL EXAM:  GENERAL: NAD  HEAD:  Atraumatic, Normocephalic  EYES: EOMI, PERRLA, conjunctiva and sclera clear  NECK: Supple, No JVD, no lymphadenopathy  CHEST/LUNG: Diffuse wheezing, more pronounced in lower lung fields; no crackles  HEART: Regular rate and rhythm; No murmurs, rubs, or gallops  ABDOMEN: Soft, Nontender, Nondistended; Bowel sounds present  EXTREMITIES:  2+ Peripheral Pulses, 1+ pitting edema  PSYCH: AAOx3  NEUROLOGY: non-focal  SKIN: No rashes or lesions    LABS:                        10.5   11.23 )-----------( 225      ( 23 Jul 2017 09:13 )             31.2     WBC Trend: 11.23<--, 11.9<--  07-23    134<L>  |  97  |  8   ----------------------------<  86  3.8   |  23  |  0.67    Ca    8.7      23 Jul 2017 09:06  Phos  2.4     07-23  Mg     2.0     07-23    TPro  6.8  /  Alb  3.4  /  TBili  0.4  /  DBili  x   /  AST  37  /  ALT  41  /  AlkPhos  238<H>  07-23    Creatinine Trend: 0.67<--, 0.81<--              RADIOLOGY & ADDITIONAL TESTS:    Chest X-ray    FINDINGS:    The lungs are clear.  There is no pleural effusion or pneumothorax.  The cardiomediastinal silhouette is within normal limits.  The visualized osseous and soft tissue structures demonstrate no acute   pathology.    IMPRESSION:     Clear lungs.      Imaging Personally Reviewed:    Consultant(s) Notes Reviewed:      Care Discussed with Consultants/Other Providers:

## 2017-07-24 NOTE — PROGRESS NOTE ADULT - PROBLEM SELECTOR PLAN 3
- hyponatremia resolving, trending 131-->134  - secondary to stress related to infection and/or decreased PO intake

## 2017-07-24 NOTE — DISCHARGE NOTE ADULT - SECONDARY DIAGNOSIS.
History of laminectomy Hyponatremia Hyperlipidemia Elevated liver enzymes H pylori ulcer Hypothyroidism Glaucoma Depression Transaminitis

## 2017-07-27 LAB
CULTURE RESULTS: SIGNIFICANT CHANGE UP
CULTURE RESULTS: SIGNIFICANT CHANGE UP
SPECIMEN SOURCE: SIGNIFICANT CHANGE UP
SPECIMEN SOURCE: SIGNIFICANT CHANGE UP

## 2018-04-05 ENCOUNTER — EMERGENCY (EMERGENCY)
Facility: HOSPITAL | Age: 83
LOS: 1 days | Discharge: HOME | End: 2018-04-05
Attending: EMERGENCY MEDICINE | Admitting: EMERGENCY MEDICINE
Payer: MEDICARE

## 2018-04-05 VITALS
DIASTOLIC BLOOD PRESSURE: 86 MMHG | OXYGEN SATURATION: 100 % | RESPIRATION RATE: 16 BRPM | SYSTOLIC BLOOD PRESSURE: 160 MMHG | TEMPERATURE: 98 F | HEART RATE: 80 BPM

## 2018-04-05 VITALS
TEMPERATURE: 98 F | DIASTOLIC BLOOD PRESSURE: 67 MMHG | OXYGEN SATURATION: 99 % | HEART RATE: 76 BPM | SYSTOLIC BLOOD PRESSURE: 156 MMHG | RESPIRATION RATE: 18 BRPM

## 2018-04-05 DIAGNOSIS — Z98.890 OTHER SPECIFIED POSTPROCEDURAL STATES: Chronic | ICD-10-CM

## 2018-04-05 DIAGNOSIS — E04.1 NONTOXIC SINGLE THYROID NODULE: Chronic | ICD-10-CM

## 2018-04-05 PROCEDURE — 99284 EMERGENCY DEPT VISIT MOD MDM: CPT

## 2018-04-05 PROCEDURE — 70450 CT HEAD/BRAIN W/O DYE: CPT | Mod: 26

## 2018-04-05 RX ORDER — ACETAMINOPHEN 500 MG
650 TABLET ORAL ONCE
Qty: 0 | Refills: 0 | Status: COMPLETED | OUTPATIENT
Start: 2018-04-05 | End: 2018-04-05

## 2018-04-05 RX ADMIN — Medication 650 MILLIGRAM(S): at 19:37

## 2018-04-05 RX ADMIN — Medication 650 MILLIGRAM(S): at 18:53

## 2018-04-05 NOTE — ED ADULT NURSE REASSESSMENT NOTE - NS ED NURSE REASSESS COMMENT FT1
pt discharged, ambulated to waiting room to meet her son. vss, resps even and unlabored, gait steady, pt signed dc instructions.

## 2018-04-05 NOTE — ED PROVIDER NOTE - OBJECTIVE STATEMENT
82F h/o GERD, arthritis, On omeprazol, asa 81mg, zoloft, a statin, states she was hit on head by a door in a [place] and fell over. No LOC. was able to get up herself and initially only felt dazed. Later felt slightly dizzy and developed headache. She has mild L hip pain. Review of Symptoms in all systems otherwise negative, except as indicated

## 2018-04-05 NOTE — ED PROVIDER NOTE - ENMT, MLM
Airway patent, Nasal mucosa clear. Mouth with normal mucosa. Throat has no vesicles, no oropharyngeal exudates and uvula is midline.  Mild contusion to occipital region. No dmitriy deformity

## 2018-04-05 NOTE — ED PROVIDER NOTE - PROGRESS NOTE DETAILS
Improved with tylenol. CT head neg. DC home with head injury instructions. Tylenol for pain. RTER if worse

## 2018-04-05 NOTE — ED ADULT NURSE REASSESSMENT NOTE - NS ED NURSE REASSESS COMMENT FT1
report recd from daytime rn pt resting comfortably in stretcher nad noted, resps even and unlabored, pt reports that h/a is improving, denies blurry vision or dizziness, will ctm closely. awaiting ct read and dispo.

## 2018-04-05 NOTE — ED ADULT TRIAGE NOTE - CHIEF COMPLAINT QUOTE
Pt had mechanical fall this afternoon at the [employer], states a door closed on her and she fell to the floor hitting the right side of her head and having minor pain to Lt hip, denies LOC. Pt went home after the [employer], but started to get a headache and wanted to get evaluated. Pt takes 81mg aspirin.

## 2018-04-05 NOTE — ED ADULT NURSE NOTE - CHIEF COMPLAINT QUOTE
Pt had mechanical fall this afternoon at the [employer], states a door closed on her and she fell to the floor hitting the right side of her head and having minor pain to Lt hip, denies LOC. Pt went home after the [employer], but started to get a headache and wanted to get evaluated. Pt takes 81mg aspirin.

## 2018-04-05 NOTE — ED ADULT NURSE NOTE - OBJECTIVE STATEMENT
Pt received to spot #28. AAOx4, pt had mechanical fall this afternoon, states a door closed on her and she fell to the floor hitting the right side of her head and having minor pain to Lt hip, denies LOC. No deformity noted left hip. Pt c/o headache since fall and would like to get evaluated to make sure she is okay. Denies change in vision. Denies nausea/vomiting. MD stacy performed at bedside. No IVSL and labs to be drawn as per MD order at this time. Pt awaiting CT scan of the head and further evaluation. no acute distress.

## 2018-06-22 NOTE — DISCHARGE NOTE ADULT - LAUNCH MEDICATION RECONCILIATION
How Severe Is Your Rash?: mild
Is This A New Presentation, Or A Follow-Up?: Rash
<<-----Click here for Discharge Medication Review

## 2018-12-28 PROBLEM — E07.9 DISORDER OF THYROID, UNSPECIFIED: Chronic | Status: ACTIVE | Noted: 2017-07-22

## 2018-12-28 PROBLEM — F32.9 MAJOR DEPRESSIVE DISORDER, SINGLE EPISODE, UNSPECIFIED: Chronic | Status: ACTIVE | Noted: 2017-07-22

## 2018-12-28 PROBLEM — K27.9 PEPTIC ULCER, SITE UNSPECIFIED, UNSPECIFIED AS ACUTE OR CHRONIC, WITHOUT HEMORRHAGE OR PERFORATION: Chronic | Status: ACTIVE | Noted: 2017-07-22

## 2018-12-28 PROBLEM — H40.9 UNSPECIFIED GLAUCOMA: Chronic | Status: ACTIVE | Noted: 2017-07-22

## 2019-01-10 ENCOUNTER — APPOINTMENT (OUTPATIENT)
Dept: ORTHOPEDIC SURGERY | Facility: CLINIC | Age: 84
End: 2019-01-10
Payer: MEDICARE

## 2019-01-10 ENCOUNTER — APPOINTMENT (OUTPATIENT)
Dept: ORTHOPEDIC SURGERY | Facility: CLINIC | Age: 84
End: 2019-01-10

## 2019-01-10 VITALS
WEIGHT: 142 LBS | HEIGHT: 59 IN | SYSTOLIC BLOOD PRESSURE: 150 MMHG | DIASTOLIC BLOOD PRESSURE: 81 MMHG | BODY MASS INDEX: 28.63 KG/M2 | HEART RATE: 80 BPM

## 2019-01-10 DIAGNOSIS — Z78.9 OTHER SPECIFIED HEALTH STATUS: ICD-10-CM

## 2019-01-10 DIAGNOSIS — Z87.39 PERSONAL HISTORY OF OTHER DISEASES OF THE MUSCULOSKELETAL SYSTEM AND CONNECTIVE TISSUE: ICD-10-CM

## 2019-01-10 DIAGNOSIS — Z80.0 FAMILY HISTORY OF MALIGNANT NEOPLASM OF DIGESTIVE ORGANS: ICD-10-CM

## 2019-01-10 DIAGNOSIS — Z82.61 FAMILY HISTORY OF ARTHRITIS: ICD-10-CM

## 2019-01-10 DIAGNOSIS — Z87.891 PERSONAL HISTORY OF NICOTINE DEPENDENCE: ICD-10-CM

## 2019-01-10 PROCEDURE — 99204 OFFICE O/P NEW MOD 45 MIN: CPT

## 2019-01-10 RX ORDER — OMEPRAZOLE 40 MG/1
40 CAPSULE, DELAYED RELEASE ORAL
Refills: 0 | Status: ACTIVE | COMMUNITY

## 2019-01-10 RX ORDER — LEVOTHYROXINE SODIUM 0.17 MG/1
TABLET ORAL
Refills: 0 | Status: ACTIVE | COMMUNITY

## 2019-01-10 RX ORDER — SIMVASTATIN 20 MG/1
20 TABLET, FILM COATED ORAL
Refills: 0 | Status: ACTIVE | COMMUNITY

## 2019-01-10 RX ORDER — OXYCODONE AND ACETAMINOPHEN 5; 325 MG/1; MG/1
5-325 TABLET ORAL
Qty: 21 | Refills: 0 | Status: ACTIVE | COMMUNITY
Start: 2019-01-10 | End: 1900-01-01

## 2019-01-13 NOTE — DISCUSSION/SUMMARY
[de-identified] : Discussed findings of today's exam and possible causes of patient's pain.  Educated patient on their most probable diagnosis of left knee osteoarthritis.  Reviewed possible courses of treatment, and we collaboratively decided best course of treatment at this time will include conservative management. We discussed how cortisone injection can be utilized to address a q.d. pain, however this patient's current level of pain started after receiving a cortisone injection, she is understandably hesitant, and does not wish to undergo any further injections at this time.  Patient will be started on a course of physical therapy to restore normal range of motion and strength as tolerated. We discussed the knee she would likely benefit from water/aquatic therapy, and/or water aerobics if available at her exercise facility. Patient cannot take oral NSAIDs do to history of gastric issues. Patient is requesting a prescription for Percocet 5 mg tabs which have provided her good relief in the past to give her some availability to have better sleep and decreased pain in the evening. I advised the patient that I do not do pain management as part of my practice, she has been given a 3 week supply and recommended to seek out a pain management consultation as I do not do narcotic pain medications for chronic management of knee osteoarthritis.  Follow up as needed.  Patient appreciates and agrees with current plan.\par \par This note was generated using dragon medical dictation software.  A reasonable effort has been made for proofreading its contents, but typos may still remain.  If there are any questions or points of clarification needed please notify my office.\par Using the website [email or web address]., the [place] prescription monitoring program registry was searched for this patient. The confidential drug utilization report was reviewed prior to the controlled substance prescription being given to the patient. Reference #: [id number]

## 2019-01-13 NOTE — PHYSICAL EXAM
[de-identified] : Constitutional: Well-nourished, well-developed, No acute distress\par Respiratory:  Good respiratory effort, no SOB\par Lymphatic: No regional lymphadenopathy, no lymphedema\par Psychiatric: Pleasant and normal affect, alert and oriented x3\par Skin: Clean dry and intact B/L UE/LE\par Musculoskeletal: normal except where as noted in regional exam\par \par B/L Hips: No asymmetry, malalignment, or swelling, Full ROM, 5/5 strength in flexion/ext, IR/ER, Abd/Add, Joints stable\par B/L Ankles: No asymmetry, malalignment, or swelling, Full ROM, 5/5 strength in DF/PF/Inv/Ev, Joints stable\par \par Right Knee:\par APPEARANCE: + enlargement of the distal femur and proximal tibia, no deformity, mild swelling\par POSITIVE TENDERNESS:  Distal femur, proximal tibia, medial jt line/retinaculum, and lateral jt line/retinaculum\par NONTENDER: patellar & quadriceps tendons, MCL/LCL, ITB at the lateral femoral condyle & Gerdy's tubercle, pes bursa. \par ROM: full extension, limited flexion to 125° due to stiffness and pain. \par RESISTIVE TESTING: painless resisted knee flex/ext, although + crepitus felt in anterior knee. \par SPECIAL TESTS: stable v/v stress. painless grind. neg ant/post drawer. + Bronson's for pain in medial and lateral joint line. \par NEURO: Normal sensation of LE, DTRs 2+/4 patella and achilles\par PULSES: 2+ DP/PT pulses\par \par Left Knee:\par APPEARANCE: + enlargement of the distal femur and proximal tibia, no deformity, mild swelling\par POSITIVE TENDERNESS:  Distal femur, proximal tibia, medial jt line/retinaculum, and lateral jt line/retinaculum\par NONTENDER: patellar & quadriceps tendons, MCL/LCL, ITB at the lateral femoral condyle & Gerdy's tubercle, pes bursa. \par ROM: full extension, limited flexion to 125° due to stiffness and pain. \par RESISTIVE TESTING: painless resisted knee flex/ext, although + crepitus felt in anterior knee. \par SPECIAL TESTS: stable v/v stress. painless grind. neg ant/post drawer. + Bronson's for pain in medial and lateral joint line. \par NEURO: Normal sensation of LE, DTRs 2+/4 patella and achilles\par PULSES: 2+ DP/PT pulses\par

## 2019-01-13 NOTE — HISTORY OF PRESENT ILLNESS
[de-identified] : Patient is here for left knee pain. She has a history of knee arthritis and around Christmas she got an injection by a pain management doctor and shortly after she experienced severe pain. She was seen at [facility] where an MRI was taken and she was told that she has meniscal tears and that she had a ruptured bakers cyst. She states that she has had continued pain since then especially after walking and at night. She finds that when she rests her pain is alleviated. Denies N/T/R.

## 2019-01-17 ENCOUNTER — APPOINTMENT (OUTPATIENT)
Dept: ORTHOPEDIC SURGERY | Facility: CLINIC | Age: 84
End: 2019-01-17

## 2019-10-10 ENCOUNTER — APPOINTMENT (OUTPATIENT)
Dept: ORTHOPEDIC SURGERY | Facility: CLINIC | Age: 84
End: 2019-10-10
Payer: MEDICARE

## 2019-10-10 PROCEDURE — 73564 X-RAY EXAM KNEE 4 OR MORE: CPT | Mod: RT

## 2019-10-10 PROCEDURE — 99203 OFFICE O/P NEW LOW 30 MIN: CPT

## 2019-10-11 NOTE — ADDENDUM
[FreeTextEntry1] : This note was written by Romelia Alcala on 10/10/2019 acting solely as a scribe for Dr. Domo Snyder.\par \par All medical record entries made by the Scribe were at my, Dr. Domo Snyder, direction and personally dictated by me on 10/10/2019. I have personally reviewed the chart and agree that the record accurately reflects my personal performance of the history, physical exam, assessment and plan.\par

## 2019-10-11 NOTE — DISCUSSION/SUMMARY
[de-identified] : 84 year old female presents with bilateral knee osteoarthritis.\par \par Given the degree of degenerative disease and inability to perform ADL's without pain, the patient is an appropriate candidate for consideration of total knee arthroplasty. An extensive discussion was conducted of the natural history of the disease and the variety of surgical and non-surgical treatment options available to them at this time. A risk/benefit analysis was discussed with the patient reviewing the advantages and disadvantages of surgical intervention versus continued trial of conservative/nonoperative modalities of OA treatment (PT, injection, medication, bracing etc).\par  \par A brief discussion was had of the proposed procedure/rehabilitation, as well as outcomes/risks and benefits. Given the current condition, it is my recommendation to seek surgical reconstruction from an arthroplasty specialist to maximize potential for good outcome. Information was provided for the arthroplasty specialists at [facility]; including Dr. Cassidy, Dr. Carrera, Tonio, Dr. Cruz and Dr. Fajardo.\par  \par All questions were answered and the patient is agreeable to consultation for TKA.\par  \par I informed the patient that they may continue to contact me with any additional concerns/questions as needed.\par  \par f/u as needed.\par

## 2019-10-11 NOTE — HISTORY OF PRESENT ILLNESS
[de-identified] : 84 year old female presents today with acute on chronic bilateral knee pain x 1 week  R>L. She was evaluated by Dr. Camargo  in January 2019 dx with OA. The pain is constant worse with standing and walking. She is taking Percocet for pain. Unable to take NSAIDs due to stomach upset. reports occasional buckling.  Denies  catching, numbness or tingling. Cortisone injection received many years ago was not helpful. \par \par The patient's past medical history, past surgical history, medications and allergies were reviewed by me today with the patient and documented accordingly. In addition, the patient's family and social history, which were noncontributory to this visit, were reviewed also.

## 2019-10-11 NOTE — PHYSICAL EXAM
[de-identified] : Oriented to time, place, person\par Mood: Normal\par Affect: Normal\par Appearance: Healthy, well appearing, no acute distress.\par Gait: Normal\par Assistive Devices: None\par \par Right knee exam\par \par Skin: Clean, dry, intact\par Inspection: Valgus malalignment, no masses, no swelling, no effusion +patellofemoral crepitus with motion, valgus defmority\par Pulses: 2+ DP/PT pulses\par ROM: 0-135 degrees of flexion. No pain with deep knee flexion/extension.\par Tenderness: No MJLT. + LJLT. No pain over the patella facets. No pain to the quadriceps tendon. No pain to the patella tendon. No posterior knee tenderness.\par Stability: Stable to varus, valgus. Negative lachman testing. Negative anterior drawer, negative posterior drawer.\par Strength: 5/5 Q/H/TA/GS/EHL, without atrophy\par Neuro: In tact to light touch throughout, DTR's normal\par Additional tests: +McMurrays test, +patellar grind test. \par \par Left knee exam\par \par Skin: Clean, dry, intact\par Inspection: varus malalignment, no masses, mild swelling, no effusion +patellofemoral crepitus with motion, varus defmority\par Pulses: 2+ DP/PT pulses\par ROM: 0-135 degrees of flexion. No pain with deep knee flexion/extension.\par Tenderness: No MJLT. + LJLT. No pain over the patella facets. No pain to the quadriceps tendon. No pain to the patella tendon. No posterior knee tenderness.\par Stability: Stable to varus, valgus. Negative lachman testing. Negative anterior drawer, negative posterior drawer.\par Strength: 5/5 Q/H/TA/GS/EHL, without atrophy\par Neuro: In tact to light touch throughout, DTR's normal\par Additional tests: +McMurrays test, +patellar grind test.  [de-identified] : The following radiographs were ordered and read by me during this patients visit. I reviewed each radiograph in detail with the patient and discussed the findings as highlighted below. \par \par 4 views of the right knee were obtained today, 10/10/2019, that show no acute fracture or dislocation. There is mild medial, severe lateral and moderate patellofemoral degenerative change seen. There is valgus deformity. No significant other obvious osseous abnormality, otherwise unremarkable.\par

## 2019-10-15 ENCOUNTER — APPOINTMENT (OUTPATIENT)
Dept: ORTHOPEDIC SURGERY | Facility: CLINIC | Age: 84
End: 2019-10-15
Payer: MEDICARE

## 2019-10-15 PROCEDURE — 99214 OFFICE O/P EST MOD 30 MIN: CPT | Mod: 25

## 2019-10-15 PROCEDURE — 20610 DRAIN/INJ JOINT/BURSA W/O US: CPT | Mod: RT

## 2019-10-15 NOTE — DISCUSSION/SUMMARY
[de-identified] : This patient has right knee osteoarthritis.  The patient is not an appropriate candidate for total knee arthroplasty at this time. An extensive discussion was conducted on the natural history of the disease and the variety of surgical and non-surgical options available to the patient including, but not limited to non-steroidal anti-inflammatory medications, steroid injections, physical therapy, maintenance of ideal body weight, and reduction of activity.  I recommended a course of physical therapy and a neoprene sleeve knee brace.  She does not wish to try Celebrex even though this would not affect her peptic ulcer disease.  Today we performed a right knee intra-articular cortisone injection.  The patient will schedule an appointment as needed.\par \par Informed consent for the right knee injection was obtained. All questions were answered. A time out was performed. The right knee was prepped and draped in sterile fashion. Using sterile technique, the right knee was injection of 2cc of Kenalog, 4cc of 1% lidocaine, 2cc of 0.5% marcaine using a 21-gauge needle. A sterile dressing was applied. Post injection instructions were reviewed. The patient tolerated the procedure well.\par

## 2019-10-15 NOTE — PHYSICAL EXAM
[de-identified] : Patient is well nourished, well-developed, in no acute distress, with appropriate mood and affect. The patient is oriented to time, place, and person. Respirations are even and unlabored. Gait evaluation does reveal a limp. There is no inguinal adenopathy. Examination of the contralateral knee shows normal range of motion, strength, no tenderness, and intact skin. The affected limb is well-perfused, without skin lesions, shows a grossly normal motor and sensory examination. Knee motion is significantly reduced and does cause significant pain. The right knee moves from 5-120 degrees. The knee is stable within that range-of-motion to AP and ML stress. The alignment of the knee is 10 degrees valgus which is correctable to neutral. Muscle strength is normal. Pedal pulses are palpable. Hip examination was negative.\par  [de-identified] : Long standing knee, AP knee, lateral knee, and patellar views of the right knee were brought in by the patient which I reviewed and demonstrate degenerative joint disease of the knee with joint space narrowing, osteophyte formation, and subchondral sclerosis.

## 2019-10-15 NOTE — HISTORY OF PRESENT ILLNESS
[de-identified] : This is very nice 84-year-old female experiencing right knee pain, which is severe in intensity.  She has been previously diagnosed with right knee osteoarthritis by another orthopedic surgeon.  The pain substantially limits activities of daily living. Walking tolerance is reduced.  She is used Percocet in the past and has seen pain management in the past and does get resolution of the pain with Percocet.  She cannot take NSAIDs because she has a history of peptic ulcer disease.  She is tried cortisone injections more than 4 years ago which did help at that time.  No recent cortisone injections or hyaluronic acid injections.  She does use a cane and/or a walker as needed.  Sometimes she will use a wheelchair.  The patient denies any radiation of the pain to the feet and it is not associated with numbness, tingling, or weakness.  She has had no recent physical therapy.  She does use a neoprene sleeve knee brace with side hinges which does help somewhat.

## 2020-04-02 ENCOUNTER — APPOINTMENT (OUTPATIENT)
Dept: ORTHOPEDIC SURGERY | Facility: CLINIC | Age: 85
End: 2020-04-02

## 2020-04-08 ENCOUNTER — APPOINTMENT (OUTPATIENT)
Dept: ORTHOPEDIC SURGERY | Facility: CLINIC | Age: 85
End: 2020-04-08
Payer: MEDICARE

## 2020-04-08 VITALS
HEIGHT: 60 IN | BODY MASS INDEX: 27.48 KG/M2 | TEMPERATURE: 97.5 F | SYSTOLIC BLOOD PRESSURE: 143 MMHG | HEART RATE: 75 BPM | WEIGHT: 140 LBS | DIASTOLIC BLOOD PRESSURE: 80 MMHG

## 2020-04-08 PROCEDURE — 99214 OFFICE O/P EST MOD 30 MIN: CPT | Mod: 57

## 2020-04-08 PROCEDURE — 28470 CLTX METATARSAL FX WO MNP EA: CPT | Mod: LT

## 2020-04-08 PROCEDURE — 73630 X-RAY EXAM OF FOOT: CPT | Mod: LT

## 2020-04-08 NOTE — DISCUSSION/SUMMARY
[de-identified] : Rosa presents with proximal fifth metatarsal fracture.  Her fracture is nondisplaced.  I have advised her to minimize weightbearing as much as possible.  I also provided her with a better fitting CAM boot for her to wear when ambulating.  I have advised her that these fractures can take at least 6 weeks to heal, and she may continue to have pain with weightbearing for the time being.  She should continue to ice and elevate the foot as needed.  We can follow-up in 1 month for repeat imaging, or via telehealth if there are still quarantine restrictions in place.\par \par Beba Hale MD, EdM\par Sports Medicine PM&R\par

## 2020-04-08 NOTE — PHYSICAL EXAM
[de-identified] : Constitutional: Well-nourished, well-developed, No acute distress\par Respiratory:  Good respiratory effort, no SOB\par Lymphatic: No regional lymphadenopathy, no lymphedema\par Psychiatric: Pleasant and normal affect, alert and oriented x3\par Skin: Clean dry and intact B/L UE/LE\par Musculoskeletal: normal except where as noted in regional exam\par \par \par left Foot:\par APPEARANCE: + hammer toe, + swelling\par POSITIVE TENDERNESS: 5th metatarsal base\par NONTENDER:  cuboid, 1st MTP, dorsum & plantar surfaces, medial heel, mid heel. \par ROM: normal throughout foot, ankle, and digits. \par RESISTIVE TESTING: painless flex/ext, abd/add of all digits. \par SPECIAL TESTS:  neg Tinel's at tarsal tunnel. \par NEURO: Normal sensation of LE, DTRs 2+/4 patella and achilles\par PULSES: 2+ DP/PT pulses\par \par B/L Knees: No asymmetry, malalignment, or swelling, Full ROM, 5/5 strength in Flex/Ext, Joints stable\par B/L Ankles: No asymmetry, malalignment, or swelling, Full ROM, 5/5 strength in DF/PF/Inv/Ev, Joints stable\par BIOMECHANICAL EXAM: no marked leg length discrepancy, [default value]hip abductor weakness b/l, no marked foot pronation, tight hams and ITB b/l.  Normal gait and station\par \par \par  [de-identified] : \par The following radiographs were ordered and read by me during this patient's visit. I reviewed each radiograph in detail with the patient and discussed the findings as highlighted below. \par \par 3 views of the left foot were obtained today that demonstrate a nondisplaced partially comminuted fracture of the base of the proximal fifth metatarsal.

## 2020-04-08 NOTE — HISTORY OF PRESENT ILLNESS
[de-identified] : 85 y/o F presents with Left foot pain since 3/27/20. She was seen at urgent care on 3/28/20 where x-rays were taken and confirmed a 5th metatarsal fracture. She was placed in an ace wrap and told to follow-up with her primary care physician. She was given a short CAM boot when seeing her PCP. The boot has provided minimal relief. She states that she has had increased pain since the injury. She has been taken Tylenol occasionally with minimal relief. She has taken Percocet  a few times due to pain with good relief. She has been icing daily with some relief. She states that pain is worst when she is walking. She uses a walker to ambulate. She denies numbness and tingling.  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

## 2020-05-06 ENCOUNTER — APPOINTMENT (OUTPATIENT)
Dept: ORTHOPEDIC SURGERY | Facility: CLINIC | Age: 85
End: 2020-05-06
Payer: MEDICARE

## 2020-05-06 VITALS
TEMPERATURE: 97.6 F | SYSTOLIC BLOOD PRESSURE: 132 MMHG | DIASTOLIC BLOOD PRESSURE: 83 MMHG | HEART RATE: 72 BPM | WEIGHT: 140 LBS | BODY MASS INDEX: 27.48 KG/M2 | HEIGHT: 60 IN

## 2020-05-06 DIAGNOSIS — S92.353A DISPLACED FRACTURE OF FIFTH METATARSAL BONE, UNSPECIFIED FOOT, INITIAL ENCOUNTER FOR CLOSED FRACTURE: ICD-10-CM

## 2020-05-06 PROCEDURE — 73630 X-RAY EXAM OF FOOT: CPT | Mod: LT

## 2020-05-06 PROCEDURE — 99024 POSTOP FOLLOW-UP VISIT: CPT

## 2020-05-10 PROBLEM — S92.353A FRACTURE OF 5TH METATARSAL: Status: ACTIVE | Noted: 2020-04-08

## 2020-05-10 NOTE — DISCUSSION/SUMMARY
[de-identified] : \par Rosa presents for follow-up of 5th metatarsal fracture.  She is doing well with minimal pain and swelling.  I have encouraged her to purchase supportive shoes to wear if the walking boot is uncomfortable.  A prescription for PT was offered but patient declines at this time.  Follow-up in 4 weeks.\par \par Beba Hale MD, EdM\par Sports Medicine PM&R\par \par \par I, Rosina Lara ATC, assisted with the history and documentation for Dr. Hale on this date 05/06/2020\par \par

## 2020-05-10 NOTE — PHYSICAL EXAM
[de-identified] : Constitutional: Well-nourished, well-developed, No acute distress\par Respiratory:  Good respiratory effort, no SOB\par Lymphatic: No regional lymphadenopathy, no lymphedema\par Psychiatric: Pleasant and normal affect, alert and oriented x3\par Skin: Clean dry and intact B/L UE/LE\par Musculoskeletal: normal except where as noted in regional exam\par \par \par left Foot:\par APPEARANCE: + hammer toe, + swelling\par POSITIVE TENDERNESS: 5th metatarsal base\par NONTENDER:  cuboid, 1st MTP, dorsum & plantar surfaces, medial heel, mid heel. \par ROM: normal throughout foot, ankle, and digits. \par RESISTIVE TESTING: painless flex/ext, abd/add of all digits. \par SPECIAL TESTS:  neg Tinel's at tarsal tunnel. \par NEURO: Normal sensation of LE, DTRs 2+/4 patella and achilles\par PULSES: 2+ DP/PT pulses\par \par B/L Knees: No asymmetry, malalignment, or swelling, Full ROM, 5/5 strength in Flex/Ext, Joints stable\par B/L Ankles: No asymmetry, malalignment, or swelling, Full ROM, 5/5 strength in DF/PF/Inv/Ev, Joints stable\par BIOMECHANICAL EXAM: no marked leg length discrepancy, [default value]hip abductor weakness b/l, no marked foot pronation, tight hams and ITB b/l.  Normal gait and station\par \par \par  [de-identified] : \par The following radiographs were ordered and read by me during this patient's visit. I reviewed each radiograph in detail with the patient and discussed the findings as highlighted below. \par \par 3 views of the left foot were obtained today that demonstrate a nondisplaced partially comminuted fracture of the base of the proximal fifth metatarsal with interval healing

## 2020-05-10 NOTE — HISTORY OF PRESENT ILLNESS
[Pain Location] : pain [] : left foot [Improving] : improving [___ wks] : [unfilled] week(s) ago [1] : an average pain level of 1/10 [0] : a minimum pain level of 0/10 [2] : a maximum pain level of 2/10 [Walking] : walking [Intermit.] : ~He/She~ states the symptoms seem to be intermittent [Direct Pressure] : worsened by direct pressure [Acetaminophen] : relieved by acetaminophen [de-identified] : Rosa Rader 83 y/o F presents for follow up post 5th metatarsal fracture on 3/27/20. Last visit she she was given a CAM boot that she reports wearing for a week or two before stopping. She said that the boot was causing too much pain in her back due to the uneven walking height. SHe reports feeling better, she is able to walk short distances  without pain. Pain is described as achy in nature, and a 2/10 in intensity. \par

## 2020-05-20 ENCOUNTER — APPOINTMENT (OUTPATIENT)
Dept: ORTHOPEDIC SURGERY | Facility: CLINIC | Age: 85
End: 2020-05-20
Payer: MEDICARE

## 2020-05-20 VITALS — TEMPERATURE: 98.7 F

## 2020-05-20 DIAGNOSIS — M17.12 UNILATERAL PRIMARY OSTEOARTHRITIS, LEFT KNEE: ICD-10-CM

## 2020-05-20 DIAGNOSIS — M17.11 UNILATERAL PRIMARY OSTEOARTHRITIS, RIGHT KNEE: ICD-10-CM

## 2020-05-20 PROCEDURE — 20610 DRAIN/INJ JOINT/BURSA W/O US: CPT | Mod: 50

## 2020-05-20 PROCEDURE — 99214 OFFICE O/P EST MOD 30 MIN: CPT | Mod: 25

## 2020-05-20 PROCEDURE — 73564 X-RAY EXAM KNEE 4 OR MORE: CPT | Mod: 50

## 2020-06-04 ENCOUNTER — APPOINTMENT (OUTPATIENT)
Dept: ORTHOPEDIC SURGERY | Facility: CLINIC | Age: 85
End: 2020-06-04

## 2021-02-10 ENCOUNTER — APPOINTMENT (OUTPATIENT)
Dept: OTOLARYNGOLOGY | Facility: CLINIC | Age: 86
End: 2021-02-10
Payer: MEDICARE

## 2021-02-10 VITALS
DIASTOLIC BLOOD PRESSURE: 65 MMHG | BODY MASS INDEX: 26.5 KG/M2 | SYSTOLIC BLOOD PRESSURE: 119 MMHG | WEIGHT: 135 LBS | TEMPERATURE: 98 F | HEIGHT: 60 IN | HEART RATE: 72 BPM

## 2021-02-10 DIAGNOSIS — J34.2 DEVIATED NASAL SEPTUM: ICD-10-CM

## 2021-02-10 DIAGNOSIS — J31.0 CHRONIC RHINITIS: ICD-10-CM

## 2021-02-10 DIAGNOSIS — H61.23 IMPACTED CERUMEN, BILATERAL: ICD-10-CM

## 2021-02-10 DIAGNOSIS — H90.3 SENSORINEURAL HEARING LOSS, BILATERAL: ICD-10-CM

## 2021-02-10 PROCEDURE — 92557 COMPREHENSIVE HEARING TEST: CPT

## 2021-02-10 PROCEDURE — 31231 NASAL ENDOSCOPY DX: CPT

## 2021-02-10 PROCEDURE — 92567 TYMPANOMETRY: CPT

## 2021-02-10 PROCEDURE — 99203 OFFICE O/P NEW LOW 30 MIN: CPT | Mod: 25

## 2021-02-10 NOTE — REASON FOR VISIT
[Initial Evaluation] : an initial evaluation for [Hearing Loss] : hearing loss [Nasal Obstruction] : nasal obstruction

## 2021-02-10 NOTE — ASSESSMENT
[FreeTextEntry1] : Patient complains that her ears get clogged when she gets sinus pressure and nasal congesetion \par \par DNS and Rhinitis and eustachian tube dysfunction\par -Rx: Steam humidification and hypertonic saline nasal irrigations \par \par \par -Hearing Test performed to evaluate the extent of hearing loss and to explain pt's symptoms-sensorineural hearing loss\par bilateral sensorineural hearing loss-cleared for hearing aids\par \par \par f/u annual\par \par \par

## 2021-02-10 NOTE — HISTORY OF PRESENT ILLNESS
[de-identified] : 5-year-old female\par Patient complains that she gets clogged ears when she has sinus pressure. States this sensation is intermittent, and has nasal congestion with it.  She does not get sinus infections. Today she is feeling well. \par Pt has no ear pain, ear drainage, hearing loss, tinnitus, vertigo, nasal congestion, nasal discharge, epistaxis, sinus infections, facial pain, throat pain, dysphagia or fevers\par \par  [Tinnitus] : no tinnitus [Anxiety] : no anxiety [Dizziness] : no dizziness [Headache] : no headache [Hearing Loss] : hearing loss [Presbycusis] : presbycusis [Nasal Congestion] : nasal congestion [Cough] : no cough [Diplopia] : no diplopia [Ear Fullness] : ear fullness [Allergic Rhinitis] : no allergic rhinitis [Adenoidectomy] : no adenoidectomy [Nasal FB Removal] : no nasal foreign body removal [Allergies] : no allergies [Asthma] : no asthma [Neck Mass] : no neck mass [Neck Pain] : no neck pain [Chills] : no chills [Hyperthyroidism] : no hyperthyroidism [Sialadenitis] : no sialadenitis [Hodgkin Disease] : no hodgkin disease [Non-Hodgkin Lymphoma] : no non-hodgkin lymphoma [None] : No risk factors have been identified. [Graves Disease] : no graves disease [Thyroid Cancer] : no thyroid cancer

## 2021-06-28 ENCOUNTER — EMERGENCY (EMERGENCY)
Facility: HOSPITAL | Age: 86
LOS: 1 days | Discharge: HOME | End: 2021-06-28
Attending: EMERGENCY MEDICINE
Payer: MEDICARE

## 2021-06-28 VITALS
SYSTOLIC BLOOD PRESSURE: 166 MMHG | TEMPERATURE: 99 F | HEART RATE: 66 BPM | HEIGHT: 60 IN | RESPIRATION RATE: 18 BRPM | WEIGHT: 134.92 LBS | OXYGEN SATURATION: 96 % | DIASTOLIC BLOOD PRESSURE: 83 MMHG

## 2021-06-28 DIAGNOSIS — Z98.890 OTHER SPECIFIED POSTPROCEDURAL STATES: Chronic | ICD-10-CM

## 2021-06-28 DIAGNOSIS — E04.1 NONTOXIC SINGLE THYROID NODULE: Chronic | ICD-10-CM

## 2021-06-28 PROCEDURE — 70450 CT HEAD/BRAIN W/O DYE: CPT | Mod: 26,MG

## 2021-06-28 PROCEDURE — 70450 CT HEAD/BRAIN W/O DYE: CPT

## 2021-06-28 PROCEDURE — G1004: CPT

## 2021-06-28 PROCEDURE — 99284 EMERGENCY DEPT VISIT MOD MDM: CPT | Mod: 25

## 2021-06-28 PROCEDURE — 99284 EMERGENCY DEPT VISIT MOD MDM: CPT

## 2021-06-28 RX ORDER — ACETAMINOPHEN 500 MG
650 TABLET ORAL ONCE
Refills: 0 | Status: COMPLETED | OUTPATIENT
Start: 2021-06-28 | End: 2021-06-28

## 2021-06-28 RX ADMIN — Medication 650 MILLIGRAM(S): at 20:55

## 2021-06-28 NOTE — ED ADULT NURSE NOTE - CHIEF COMPLAINT QUOTE
Fell off chair, hitting her head on the wall, and fell to the ground this evening. Denies LOC. Reports she was able to get up off the ground on own power but "struggled." Currently taking 81mg aspirin. Reports headache, nausea after fall.

## 2021-06-28 NOTE — ED PROVIDER NOTE - RAPID ASSESSMENT
85 F with PMHx of HLD, and thyroid disease presents with HA s/p fall approximately one hour PTA. Reports was going to sit on a chair when she missed the chair slightly and fell off. PT struck the right side of her head and fell to the ground. Pt got up immediately, however, had slight difficulty. Now c/o HA and nausea. Pt currently on aspirin. Denies LOC, blurry vision, neck pain, or extremity pain.    Scribe Statement: I, Leida Marinelli, attest that this documentation has been prepared under the direction and in the presence of Shayne Oscar) 85 F with PMHx of HLD, and thyroid disease presents with HA s/p fall approximately one hour PTA. Reports was going to sit on a chair when she missed the chair slightly and fell off. PT struck the right side of her head and fell to the ground. Pt got up immediately, however, had slight difficulty. Now c/o HA and nausea. Pt currently on aspirin. Denies LOC, blurry vision, neck pain, or extremity pain/hip pain, difficulty walking.    Scribe Statement: I, Leida Marinelli, attest that this documentation has been prepared under the direction and in the presence of Shayne Oscar (PA)  Shayne Oscar PA-C: The scribe's documentation has been prepared under my direction and personally reviewed by me in its entirety. I confirm that the note above accurately reflects history obtained.   Patient was rapidly assessed via telemedicine encounter; a limited history was obtained. The patient will be seen and further examined and worked up in the main ED and their care will be completed by the main ED team. Receiving team will follow up on labs, analgesia, any clinical imaging, and perform reassessment and disposition of the patient as clinically indicated. All decisions regarding the progression of care will be made at their discretion.

## 2021-06-28 NOTE — ED PROVIDER NOTE - ATTENDING CONTRIBUTION TO CARE
Pt missed chair, fell, no LOC, +HA.  Pt awake, alert, slightly unsteady with walking with drift to right.

## 2021-06-28 NOTE — ED ADULT NURSE NOTE - NSIMPLEMENTINTERV_GEN_ALL_ED
Implemented All Fall with Harm Risk Interventions:  [place] to call system. Call bell, personal items and telephone within reach. Instruct patient to call for assistance. Room bathroom lighting operational. Non-slip footwear when patient is off stretcher. Physically safe environment: no spills, clutter or unnecessary equipment. Stretcher in lowest position, wheels locked, appropriate side rails in place. Provide visual cue, wrist band, yellow gown, etc. Monitor gait and stability. Monitor for mental status changes and reorient to person, place, and time. Review medications for side effects contributing to fall risk. Reinforce activity limits and safety measures with patient and family. Provide visual clues: red socks.

## 2021-06-28 NOTE — ED PROVIDER NOTE - PATIENT PORTAL LINK FT
You can access the FollowMyHealth Patient Portal offered by [facility] by registering at the following website: [email or web address]. By joining [employer]’s FollowMyHealth portal, you will also be able to view your health information using other applications (apps) compatible with our system.

## 2021-06-28 NOTE — ED PROVIDER NOTE - OBJECTIVE STATEMENT
85 F with PMHx of HLD, and thyroid disease presents with HA s/p fall approximately one hour PTA. Reports was going to sit on a chair when she missed the chair slightly and fell off. PT struck the right side of her head and fell to the ground. Pt got up immediately, however, had slight difficulty. Now c/o HA and nausea. Pt currently on aspirin. Denies LOC, blurry vision, neck pain, or extremity pain/hip pain, difficulty walking.

## 2021-06-28 NOTE — ED ADULT NURSE NOTE - OBJECTIVE STATEMENT
85y F presents s/p fall today. pt states she was trying to get onto her chair when she missed the chair and fell hitting the right side of her head and falling to the ground. pt was able to ambulate after event. on arrival to [facility] pt with unsteady gait. medicated in triage. pt c/o mild nausea/ no LOC or vision changes , numbness or tingling.

## 2021-06-28 NOTE — ED ADULT TRIAGE NOTE - CHIEF COMPLAINT QUOTE
Fell off chair, hitting her head on the wall, and fell to the ground. Denies LOC. Reports she was able to get up off the ground on own power but "struggled." Currently taking 81mg aspirin. Reports headache, nausea after fall. Fell off chair, hitting her head on the wall, and fell to the ground this evening. Denies LOC. Reports she was able to get up off the ground on own power but "struggled." Currently taking 81mg aspirin. Reports headache, nausea after fall.

## 2021-07-20 ENCOUNTER — APPOINTMENT (OUTPATIENT)
Dept: ULTRASOUND IMAGING | Facility: CLINIC | Age: 86
End: 2021-07-20
Payer: MEDICARE

## 2021-07-20 PROCEDURE — 76770 US EXAM ABDO BACK WALL COMP: CPT

## 2021-08-04 ENCOUNTER — APPOINTMENT (OUTPATIENT)
Dept: ORTHOPEDIC SURGERY | Facility: CLINIC | Age: 86
End: 2021-08-04

## 2021-10-20 ENCOUNTER — APPOINTMENT (OUTPATIENT)
Dept: ORTHOPEDIC SURGERY | Facility: CLINIC | Age: 86
End: 2021-10-20

## 2021-11-05 NOTE — ED PROVIDER NOTE - NS ED ATTENDING STATEMENT MOD
I have personally seen and examined this patient.  I have fully participated in the care of this patient. I have reviewed all pertinent clinical information, including history, physical exam, plan and the Resident’s note and agree except as noted. Moderna dose 1, 2, and 3

## 2022-01-12 DIAGNOSIS — Z00.00 ENCOUNTER FOR GENERAL ADULT MEDICAL EXAMINATION W/OUT ABNORMAL FINDINGS: ICD-10-CM

## 2022-01-14 ENCOUNTER — APPOINTMENT (OUTPATIENT)
Dept: ORTHOPEDIC SURGERY | Facility: CLINIC | Age: 87
End: 2022-01-14
Payer: MEDICARE

## 2022-01-14 DIAGNOSIS — M70.62 TROCHANTERIC BURSITIS, LEFT HIP: ICD-10-CM

## 2022-01-14 DIAGNOSIS — M17.0 BILATERAL PRIMARY OSTEOARTHRITIS OF KNEE: ICD-10-CM

## 2022-01-14 PROCEDURE — 99214 OFFICE O/P EST MOD 30 MIN: CPT | Mod: 25

## 2022-01-14 PROCEDURE — 73502 X-RAY EXAM HIP UNI 2-3 VIEWS: CPT | Mod: LT

## 2022-01-14 PROCEDURE — 20610 DRAIN/INJ JOINT/BURSA W/O US: CPT | Mod: LT

## 2022-01-14 NOTE — DISCUSSION/SUMMARY
[de-identified] : This patient has left hip trochanteric bursitis.  The patient is not an appropriate candidate for surgical intervention at this time. An extensive discussion was conducted on the natural history of the disease and the variety of surgical and non-surgical options available to the patient including, but not limited to non-steroidal anti-inflammatory medications, steroid injections, physical therapy, maintenance of ideal body weight, and reduction of activity.  Physical therapy prescribed for use of foam roller and stretching and strengthening of the abductors.  Today we performed left hip trochanteric bursa injections.\par The patient will schedule an appointment as needed.\par \par Informed consent for the left hip trochanteric bursa injection was obtained. All questions were answered. A time out was performed. The left lateral hip was prepped and draped in sterile fashion. Using sterile technique, the left greater trochanter was injection with 2cc of Kenalog and 4cc of 0.25% marcaine using a 21-gauge needle. A sterile dressing was applied. Post injection instructions were reviewed. The patient reported relief of symptoms after the injection. The patient tolerated the procedure well.\par

## 2022-01-14 NOTE — PHYSICAL EXAM
[de-identified] : Patient is well nourished, well-developed, in no acute distress, with appropriate mood and affect. The patient is oriented to time, place, and person. Respirations are even and unlabored. Gait evaluation does not reveal a limp. There is no inguinal adenopathy. Examination of the contralateral hip shows normal range of motion, strength, no tenderness, and intact skin. The affected limb is well-perfused and showed 2+ dp/pt pulses, without skin lesions, shows a grossly normal motor and sensory examination. Examination of the hip shows no skin lesions. Hip motion is full and painless from left degrees extension to flexion, 20 degrees adduction and 20 degrees abduction, and 15 degrees internal and 30 degrees external rotation. Leg lengths are approximately equal. FADIR is negative and ISIS is negative. Stinchfield test is negative. Both hips are stable and muscle strength is normal with good strength with resisted abduction and adduction. Pedal pulses are palpable.  Tender to palpation over the lateral aspect of the hip. [de-identified] : AP and lateral x-rays of the left hip, pelvis, and femur were ordered and taken in the office and demonstrate no evidence of degenerative joint disease of the hip with maintained joint space and no evidence of fractures or other intraarticular pathology.

## 2022-01-14 NOTE — HISTORY OF PRESENT ILLNESS
[de-identified] : This is very nice 86-year-old female experiencing left lateral hip pain, which is severe in intensity. The pain substantially limits activities of daily living. Walking tolerance is reduced.  Pain started when she was working some balance issues with physical therapy.  She uses a cane.  Pain at night when she lies on that side.  Not taking any medications for this.  The patient denies any radiation of the pain to the feet and it is not associated with numbness, tingling, or weakness.

## 2022-04-06 ENCOUNTER — EMERGENCY (EMERGENCY)
Facility: HOSPITAL | Age: 87
LOS: 1 days | Discharge: HOME | End: 2022-04-06
Attending: EMERGENCY MEDICINE
Payer: MEDICARE

## 2022-04-06 VITALS
OXYGEN SATURATION: 95 % | SYSTOLIC BLOOD PRESSURE: 155 MMHG | TEMPERATURE: 98 F | WEIGHT: 134.92 LBS | HEART RATE: 74 BPM | RESPIRATION RATE: 18 BRPM | DIASTOLIC BLOOD PRESSURE: 82 MMHG | HEIGHT: 60 IN

## 2022-04-06 DIAGNOSIS — E04.1 NONTOXIC SINGLE THYROID NODULE: Chronic | ICD-10-CM

## 2022-04-06 DIAGNOSIS — Z98.890 OTHER SPECIFIED POSTPROCEDURAL STATES: Chronic | ICD-10-CM

## 2022-04-06 PROCEDURE — 99283 EMERGENCY DEPT VISIT LOW MDM: CPT

## 2022-04-06 PROCEDURE — 99283 EMERGENCY DEPT VISIT LOW MDM: CPT | Mod: GC

## 2022-04-06 RX ORDER — DIPHENHYDRAMINE HCL 50 MG
25 CAPSULE ORAL ONCE
Refills: 0 | Status: COMPLETED | OUTPATIENT
Start: 2022-04-06 | End: 2022-04-06

## 2022-04-06 RX ORDER — DEXAMETHASONE 0.5 MG/5ML
10 ELIXIR ORAL ONCE
Refills: 0 | Status: COMPLETED | OUTPATIENT
Start: 2022-04-06 | End: 2022-04-06

## 2022-04-06 RX ADMIN — Medication 10 MILLIGRAM(S): at 20:06

## 2022-04-06 RX ADMIN — Medication 25 MILLIGRAM(S): at 20:07

## 2022-04-06 NOTE — ED ADULT NURSE NOTE - OBJECTIVE STATEMENT
85 y/o female with hx of angioedema with unknown cause presents to ED with c/o lip swelling onset today. Pt also c/o itchy rash onset 4 days ago. Pt is a&ox4, spontaneous respirations and equal chest rise. VSS on RA; NSR on cardiac monitor. Edema to upper lip. No tongue or throat swelling noted. Hives noted to pt's upper back and chest. Pt does not recall using new skin products or detergents. She states she has not started any new medications. She denies dyspnea, CP, cough, palpitation, fever, chills, n/v/d. Bed locked and in lowest position, call bell within reach, side rails up for safety. Will continue to closely monitor pt.

## 2022-04-06 NOTE — ED PROVIDER NOTE - NSFOLLOWUPINSTRUCTIONS_ED_ALL_ED_FT
You came to the hospital due to swelling of your upper lip, this may be due to something you ate or an environmental stimuli. You received benadryl and dexamethasone which helped improve your lip swelling. Please follow up with your primary care provider and an allergist after you leave the hospital.

## 2022-04-06 NOTE — ED ADULT TRIAGE NOTE - CHIEF COMPLAINT QUOTE
Pt c/o of itchiness x 1wk and new onset Lip swelling PMH Angioedema Denies any food or medication   allergies. Airway patent.

## 2022-04-06 NOTE — ED PROVIDER NOTE - NSICDXPASTMEDICALHX_GEN_ALL_CORE_FT
PAST MEDICAL HISTORY:  Anxiety     Back ache     Depression     Glaucoma     H pylori ulcer     HLD (hyperlipidemia)     Thyroid disease

## 2022-04-06 NOTE — ED PROVIDER NOTE - ATTENDING CONTRIBUTION TO CARE
Dr. Philip (Attending Physician)  Pt. with ho angioedema pw right upper lip angioedema, rash with hives on the back and pruritis, will give dexamethasone, benadryl, and reassess. Dr. Philip (Attending Physician)  Pt. with ho angioedema pw right upper lip mild angioedema, no posterior op swelling, shortness of breath or wheezing, rash with hives on the back and anterior neck and pruritis, will give dexamethasone, benadryl, and reassess observe x 2 hours. likely allergic reaction

## 2022-04-06 NOTE — ED PROVIDER NOTE - OBJECTIVE STATEMENT
85 y/o female with pmh of HLD and thyroid diease who presented to the ED for lip swelling and pruritic rash. The pt states that the lip swelling started this afternoon and the pruritic rash began a few days ago. The pt has not taken anything to help with these symptoms. She decided to come to the hospital due to her previous hx of angioedema, in which at the time was unclear it was 2/2 to preop medication from a knee surgery vs an environmental stimulant. Today she admits to no difficulty breathing or swallowing. Denies changes in food, new detergents, or new stimuli in her environment. No reported fever, chills, nausea, vomiting, or diarrhea at home.

## 2022-04-06 NOTE — ED PROVIDER NOTE - NSFOLLOWUPCLINICS_GEN_ALL_ED_FT
[facility]  Allergy  [address] [place] [address]  Phone: [phone]  Fax:   Follow Up Time: 4-6 Days

## 2022-04-06 NOTE — ED PROVIDER NOTE - NSICDXFAMILYHX_GEN_ALL_CORE_FT
FAMILY HISTORY:  Sibling  Still living? Unknown  Family history of coronary artery disease, Age at diagnosis: Age Unknown  Family history of non-Hodgkin's lymphoma, Age at diagnosis: Age Unknown  Family history of stomach cancer, Age at diagnosis: Age Unknown

## 2022-04-06 NOTE — ED PROVIDER NOTE - PATIENT PORTAL LINK FT
You can access the FollowMyHealth Patient Portal offered by  by registering at the following website: [email or web address]. By joining [email or web address]’s FollowMyHealth portal, you will also be able to view your health information using other applications (apps) compatible with our system.

## 2022-04-06 NOTE — ED PROVIDER NOTE - PHYSICAL EXAMINATION
PHYSICAL EXAM:  T(C): 36.7 (04-06-22 @ 18:30), Max: 36.7 (04-06-22 @ 18:30)  HR: 74 (04-06-22 @ 18:30) (74 - 74)  BP: 155/82 (04-06-22 @ 18:30) (155/82 - 155/82)  RR: 18 (04-06-22 @ 18:30) (18 - 18)  SpO2: 95% (04-06-22 @ 18:30) (95% - 95%)  GENERAL: NAD, well-developed  HEAD:  Atraumatic, Normocephalic  EYES: EOMI, PERRLA, conjunctiva and sclera clear  ORAL: No tongue swelling  NECK: Supple  CHEST/LUNG: Clear to auscultation bilaterally; No wheezes or crackles  HEART: Regular rate and rhythm; No murmurs, rubs, or gallops  ABDOMEN: Soft, Nontender, Nondistended; Bowel sounds present  EXTREMITIES:  2+ Peripheral Pulses, No clubbing, cyanosis, or edema  PSYCH: AAOx3  NEUROLOGY: non-focal  SKIN: Pruritic generalized erythema on upper chest and hives present on upper back  Psych: Not depressed or anxious.

## 2022-04-06 NOTE — ED ADULT NURSE REASSESSMENT NOTE - NS ED NURSE REASSESS COMMENT FT1
Pt a&ox4, spontaneous respirations and equal chest rise. Pt denies itchiness, tongue or throat swelling, difficulty breathing, CP, SOB, fever, chills. VSS on RA. Safety precautions maintained. Will continue to closely monitor pt.

## 2022-04-06 NOTE — ED PROVIDER NOTE - CLINICAL SUMMARY MEDICAL DECISION MAKING FREE TEXT BOX
85 y/o female with hx of HLD, thyroid disease, and previous hx of angioedema who presented to the ED for lip swelling and pruritic rash. Appears to be angioedema and allergic dermatitis 2/2 to environmental stimuli. VS stable and pt protecting airway on physical exam. Will provide pt oral dexamethasone 10 mg and PO Benadryl 25 mg x1. Allergy referral provided.

## 2022-04-07 VITALS
OXYGEN SATURATION: 100 % | HEART RATE: 77 BPM | SYSTOLIC BLOOD PRESSURE: 134 MMHG | DIASTOLIC BLOOD PRESSURE: 68 MMHG | RESPIRATION RATE: 16 BRPM

## 2022-04-07 NOTE — ED ADULT NURSE REASSESSMENT NOTE - NS ED NURSE REASSESS COMMENT FT1
Patient d/c. Reviewed d/c paperwork with patients, all questions answered at this time. Patient verbalizes understanding. IV removed. Patient instructed to return to the ER for any worsening s/s including chest pain, SOB, fever, n/v/d. Patient alert and stable at time of d/c. Patient will follow up with allergist.

## 2022-05-06 ENCOUNTER — APPOINTMENT (OUTPATIENT)
Dept: PSYCHIATRY | Facility: CLINIC | Age: 87
End: 2022-05-06
Payer: MEDICARE

## 2022-05-06 PROCEDURE — 99205 OFFICE O/P NEW HI 60 MIN: CPT

## 2022-05-31 ENCOUNTER — APPOINTMENT (OUTPATIENT)
Dept: PEDIATRIC ALLERGY IMMUNOLOGY | Facility: CLINIC | Age: 87
End: 2022-05-31

## 2022-08-05 ENCOUNTER — APPOINTMENT (OUTPATIENT)
Dept: PSYCHIATRY | Facility: CLINIC | Age: 87
End: 2022-08-05

## 2022-08-05 PROCEDURE — 99214 OFFICE O/P EST MOD 30 MIN: CPT

## 2022-09-13 ENCOUNTER — APPOINTMENT (OUTPATIENT)
Dept: OPHTHALMOLOGY | Facility: CLINIC | Age: 87
End: 2022-09-13

## 2022-09-13 ENCOUNTER — NON-APPOINTMENT (OUTPATIENT)
Age: 87
End: 2022-09-13

## 2022-09-13 PROCEDURE — 92004 COMPRE OPH EXAM NEW PT 1/>: CPT

## 2022-11-04 ENCOUNTER — APPOINTMENT (OUTPATIENT)
Dept: PSYCHIATRY | Facility: CLINIC | Age: 87
End: 2022-11-04

## 2022-11-04 PROCEDURE — 99214 OFFICE O/P EST MOD 30 MIN: CPT

## 2022-11-04 NOTE — DISCUSSION/SUMMARY
[FreeTextEntry1] : 87-year-old female with depression anxiety.  Plan continue Zoloft 100 mg lorazepam 1 mg as needed.  Follow-up in 3 months.

## 2022-11-04 NOTE — FAMILY HISTORY
[FreeTextEntry1] : Patient born in [place] mother  of a stroke Father  of a heart attack she had 2 brothers 1  of a heart attack 1  of cancer.  One of the brothers had a history of alcoholism.  Her sister  of cancer she had a history of depression.  No other psychiatric alcohol or drug history in the family.  No abuse history growing up.

## 2022-11-04 NOTE — CURRENT PSYCHIATRIC SYMPTOMS
[Insomnia] : no insomnia disorder [de-identified] : Denied [de-identified] : Denied [de-identified] : Denied [de-identified] : Denied [de-identified] : None [de-identified] : None [de-identified] : None

## 2022-11-04 NOTE — SOCIAL HISTORY
[FreeTextEntry1] : Patient grew up in [place].  She graduated from high school 1954.  High school was good she was an average student.  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  She started working at [employer] where she worked for 11 years.  After her second son she moved to [place].  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  After assisted she traveled with social she is to travel to [place] every year until about 3 years ago.  Patient was  in 1967  in 1992.  She has 2 sons ages 51 and 53 by the marriage.  Son age 51 is schizophrenic.

## 2022-11-04 NOTE — PAST MEDICAL HISTORY
[FreeTextEntry1] : Patient was first treated about 30 years ago.  She states the symptoms began when she was first going through menopause but then accelerated after the problems with her .  The only medication she is ever been on has been Zoloft and lorazepam.

## 2022-11-04 NOTE — HISTORY OF PRESENT ILLNESS
[FreeTextEntry1] : Moods have been good.  Appetite weight stable.  Try cutting back on lorazepam but not able to sleep at night.  No new medications or medical problems.  Socializing spending a lot of time with family. [de-identified] : Patient is an 86-year-old female, , retired.  Patient lives by herself.  Patient states her psychiatrist is retiring she has seen him for approximately 30 years.  During that time she has been well maintained on Zoloft 100 mg along with lorazepam 1 mg.  Patient feels that she is very stable on these medications.  Situation began to get serious and go on medication about 2530 years ago when she was having difficulty with her .  He was an alcoholic who is also gambling.  During the same time her son was diagnosed with schizophrenia was very difficult with multiple hospitalizations.  Right now she feels that she does not need any changes in her medication she is doing well with them.  She feels that life is going well she still can drive if she goes out she has a good group of friends that she socializes with.  She lives close to her other son she spends a lot of time with her grandson age 12.  After the divorce the patient to help take care of her  he had surgeries done he eventually  about a year ago.  She states that occasionally she gets a little anxious in the morning she might be a little down.  She does have difficulty falling asleep at night for which she takes the lorazepam.\par \par Patient gets up at about 8:00 in the morning.  In the morning she will have a couple coffee read the paper in the afternoon she will do some housework shop go to different appointments in the evening she will make dinner watch television read the paper she goes to bed about 1:00 in the morning.  It takes about an hour to fall asleep once asleep she stays asleep appetite is good height 4 feet 11 weight 135 pounds.  Energy level is good.

## 2022-11-04 NOTE — PHYSICAL EXAM
[None] : none [FreeTextEntry2] : slow gait walks with a cane [Soft] : soft [Normal] : normal [Fair] : fair

## 2023-01-27 ENCOUNTER — APPOINTMENT (OUTPATIENT)
Dept: PSYCHIATRY | Facility: CLINIC | Age: 88
End: 2023-01-27
Payer: MEDICARE

## 2023-01-27 PROCEDURE — 99214 OFFICE O/P EST MOD 30 MIN: CPT

## 2023-01-27 NOTE — SOCIAL HISTORY
[FreeTextEntry1] : Patient grew up in [place].  She graduated from high school 1954.  High school was good she was an average student.  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  She started working at [employer] where she worked for 11 years.  After her second son she moved to [place].  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  After long term she traveled with social she is to travel to [place] every year until about 3 years ago.  Patient was  in 1967  in 1992.  She has 2 sons ages 51 and 53 by the marriage.  Son age 51 is schizophrenic.

## 2023-01-27 NOTE — FAMILY HISTORY
[FreeTextEntry1] : Patient born in [place] mother  of a stroke Father  of a heart attack she had 2 brothers 1  of a heart attack 1  of cancer.  One of the brothers had a history of alcoholism.  Her sister  of cancer she had a history of depression.  No other psychiatric alcohol or drug history in the family.  No abuse history growing up.

## 2023-01-27 NOTE — HISTORY OF PRESENT ILLNESS
[FreeTextEntry1] : Moods have been stable.  Having some difficulty with sleep because of back pain.  Had been taking oxycodone.  Going to pain management.  Socialization good. [de-identified] : Patient is an 86-year-old female, , retired.  Patient lives by herself.  Patient states her psychiatrist is retiring she has seen him for approximately 30 years.  During that time she has been well maintained on Zoloft 100 mg along with lorazepam 1 mg.  Patient feels that she is very stable on these medications.  Situation began to get serious and go on medication about 2530 years ago when she was having difficulty with her .  He was an alcoholic who is also gambling.  During the same time her son was diagnosed with schizophrenia was very difficult with multiple hospitalizations.  Right now she feels that she does not need any changes in her medication she is doing well with them.  She feels that life is going well she still can drive if she goes out she has a good group of friends that she socializes with.  She lives close to her other son she spends a lot of time with her grandson age 12.  After the divorce the patient to help take care of her  he had surgeries done he eventually  about a year ago.  She states that occasionally she gets a little anxious in the morning she might be a little down.  She does have difficulty falling asleep at night for which she takes the lorazepam.\par \par Patient gets up at about 8:00 in the morning.  In the morning she will have a couple coffee read the paper in the afternoon she will do some housework shop go to different appointments in the evening she will make dinner watch television read the paper she goes to bed about 1:00 in the morning.  It takes about an hour to fall asleep once asleep she stays asleep appetite is good height 4 feet 11 weight 135 pounds.  Energy level is good.

## 2023-01-27 NOTE — CURRENT PSYCHIATRIC SYMPTOMS
[Insomnia] : no insomnia disorder [de-identified] : Denied [de-identified] : Denied [de-identified] : Denied [de-identified] : Denied [de-identified] : None [de-identified] : None [de-identified] : None

## 2023-01-30 NOTE — ED ADULT NURSE NOTE - NSSISCREENINGQ1_ED_A_ED
Dr. Mercedes Shrestha     Please advise on pathology results and when recall colonoscopy needed.     Thank you No

## 2023-04-20 ENCOUNTER — APPOINTMENT (OUTPATIENT)
Dept: PSYCHIATRY | Facility: CLINIC | Age: 88
End: 2023-04-20
Payer: MEDICARE

## 2023-04-20 PROCEDURE — 99214 OFFICE O/P EST MOD 30 MIN: CPT

## 2023-04-21 NOTE — SOCIAL HISTORY
[FreeTextEntry1] : Patient grew up in [place].  She graduated from high school 1954.  High school was good she was an average student.  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  She started working at [employer] where she worked for 11 years.  After her second son she moved to [place].  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  After [employer] she traveled with social she is to travel to [place] every year until about 3 years ago.  Patient was  in 1967  in 1992.  She has 2 sons ages 51 and 53 by the marriage.  Son age 51 is schizophrenic. Impaired gait/Impaired vision/Poor balance/Weakness

## 2023-04-21 NOTE — HISTORY OF PRESENT ILLNESS
[FreeTextEntry1] : Patient has been doing well.  Denied any new medications or medical problems.  Still having some pain. [de-identified] : Patient is an 86-year-old female, , retired.  Patient lives by herself.  Patient states her psychiatrist is retiring she has seen him for approximately 30 years.  During that time she has been well maintained on Zoloft 100 mg along with lorazepam 1 mg.  Patient feels that she is very stable on these medications.  Situation began to get serious and go on medication about 2530 years ago when she was having difficulty with her .  He was an alcoholic who is also gambling.  During the same time her son was diagnosed with schizophrenia was very difficult with multiple hospitalizations.  Right now she feels that she does not need any changes in her medication she is doing well with them.  She feels that life is going well she still can drive if she goes out she has a good group of friends that she socializes with.  She lives close to her other son she spends a lot of time with her grandson age 12.  After the divorce the patient to help take care of her  he had surgeries done he eventually  about a year ago.  She states that occasionally she gets a little anxious in the morning she might be a little down.  She does have difficulty falling asleep at night for which she takes the lorazepam.\par \par Patient gets up at about 8:00 in the morning.  In the morning she will have a couple coffee read the paper in the afternoon she will do some housework shop go to different appointments in the evening she will make dinner watch television read the paper she goes to bed about 1:00 in the morning.  It takes about an hour to fall asleep once asleep she stays asleep appetite is good height 4 feet 11 weight 135 pounds.  Energy level is good.

## 2023-04-21 NOTE — PHYSICAL EXAM
[None] : none [Soft] : soft [Normal] : normal [Fair] : fair [FreeTextEntry2] : slow gait walks with a cane

## 2023-04-21 NOTE — CURRENT PSYCHIATRIC SYMPTOMS
[Insomnia] : no insomnia disorder [de-identified] : Denied [de-identified] : Denied [de-identified] : Denied [de-identified] : Denied [de-identified] : None [de-identified] : None [de-identified] : None

## 2023-04-21 NOTE — FAMILY HISTORY
[FreeTextEntry1] : Patient born in [place] mother  of a stroke Father  of a heart attack she had 2 brothers 1  of a heart attack 1  of cancer.  One of the brothers had a history of alcoholism.  Her sister  of cancer she had a history of depression.  No other psychiatric alcohol or drug history in the family.  No abuse history growing up.

## 2023-07-19 ENCOUNTER — APPOINTMENT (OUTPATIENT)
Dept: PSYCHIATRY | Facility: CLINIC | Age: 88
End: 2023-07-19
Payer: MEDICARE

## 2023-07-19 PROCEDURE — 99214 OFFICE O/P EST MOD 30 MIN: CPT

## 2023-07-19 NOTE — DISCUSSION/SUMMARY
[FreeTextEntry1] : 87-year-old female with depression anxiety.  Plan continue lorazepam 1 mg as needed increase Zoloft to 150 mg.  Follow-up in 6 months if stable 4 weeks if not.

## 2023-07-19 NOTE — CURRENT PSYCHIATRIC SYMPTOMS
[Insomnia] : no insomnia disorder [de-identified] : Denied [de-identified] : Denied [de-identified] : Denied [de-identified] : Denied [de-identified] : None [de-identified] : None [de-identified] : None No

## 2023-07-19 NOTE — HISTORY OF PRESENT ILLNESS
[FreeTextEntry1] : Patient feeling depressed.  Wakes up in the morning no motivation no interest in doing anything no energy.  Tending to isolate.  Diet good.  Last physical exam 6 months ago lab work all within normal limits.  No new medications or medical problems. [de-identified] : Patient is an 86-year-old female, , retired.  Patient lives by herself.  Patient states her psychiatrist is retiring she has seen him for approximately 30 years.  During that time she has been well maintained on Zoloft 100 mg along with lorazepam 1 mg.  Patient feels that she is very stable on these medications.  Situation began to get serious and go on medication about 2530 years ago when she was having difficulty with her .  He was an alcoholic who is also gambling.  During the same time her son was diagnosed with schizophrenia was very difficult with multiple hospitalizations.  Right now she feels that she does not need any changes in her medication she is doing well with them.  She feels that life is going well she still can drive if she goes out she has a good group of friends that she socializes with.  She lives close to her other son she spends a lot of time with her grandson age 12.  After the divorce the patient to help take care of her  he had surgeries done he eventually  about a year ago.  She states that occasionally she gets a little anxious in the morning she might be a little down.  She does have difficulty falling asleep at night for which she takes the lorazepam.\par \par Patient gets up at about 8:00 in the morning.  In the morning she will have a couple coffee read the paper in the afternoon she will do some housework shop go to different appointments in the evening she will make dinner watch television read the paper she goes to bed about 1:00 in the morning.  It takes about an hour to fall asleep once asleep she stays asleep appetite is good height 4 feet 11 weight 135 pounds.  Energy level is good.

## 2023-07-19 NOTE — SOCIAL HISTORY
"HPI:    Mr. Castillo Every comes in for a physical today. Overall quite healthy. He has h/o RA on minimal medication. He does not smoke nor abuse EtOH. He consider his risks low and does not feel he needs STD nor HIV testing. Father with \"early\" DM. He denies any hernias or  Testicular masses. No other HEENT, cardiopulmonary, abdominal, , neurological, systemic, psychiatric, lymphatic, endocrine, vascular complaints.     Past Medical History:   Diagnosis Date     Biceps tendinopathy      GERD (gastroesophageal reflux disease)      Mucocele of lip      Spondyloarthropathy      Past Surgical History:   Procedure Laterality Date     APPENDECTOMY       HERNIA REPAIR, INGUINAL RT/LT      left     PE:    Vitals noted, gen, nad, cooperative, alert, neck supple nl rom, lungs with good air movement, no B carotid bruits, RRR, S1, S2, no MRG, abdomen, no acute findings. Grossly normal neurological exam. No B LE swelling.     A/P:    1. Immunizations; two doses of Pfizer COVID vaccination. He is scheduled to get the booster today. Tdap today 11/16/2021.  This year's influenza vaccine has been done.   2. Colonoscopy; ordered today   3. R shoulder; See Dr. Molina's orthopedic note from 10/18/2021   4. See Dr Dailey's ENT note 8/31/2021 regarding large R lip mucocele  5. Seen in Rheumatology, Dr. Luna 6/28/2021 for psoriasis and RA and spondyloarthropathy  And he remains on sulfasalazine and celebrex   6. Dermatology; he does not feel he needs to see dermatology for a skin screen   7. Vitamin D; ordered future on 6/28/2021 by Dr. Luna  8. Lipids; placed future order for lipids   9. Sleep; he goes to sleep easily but wakes up in the night and can't get back to sleep. He states he does not feel this affects him adversely during the day. No snoring? At this time he does not feel he needs to be evaluated in sleep medicine nor start any medication. He has tried Melatonin in the past     "
[FreeTextEntry1] : Patient grew up in [place].  She graduated from high school 1954.  High school was good she was an average student.  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  She started working at [employer] where she worked for 11 years.  After her second son she moved to [place].  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  After group home she traveled with social she is to travel to [place] every year until about 3 years ago.  Patient was  in 1967  in 1992.  She has 2 sons ages 51 and 53 by the marriage.  Son age 51 is schizophrenic.

## 2023-07-19 NOTE — FAMILY HISTORY
[FreeTextEntry1] : Patient born in [place] mother  of a stroke Father  of a heart attack she had 2 brothers 1  of a heart attack 1  of cancer.  One of the brothers had a history of alcoholism.  Her sister  of cancer she had a history of depression.  No other psychiatric alcohol or drug history in the family.  No abuse history growing up.

## 2023-08-16 NOTE — ED ADULT NURSE NOTE - FINAL NURSING ELECTRONIC SIGNATURE
Patient Education    BRIGHT FUTURES HANDOUT- PATIENT  15 THROUGH 17 YEAR VISITS  Here are some suggestions from [facility] experts that may be of value to your family.     HOW YOU ARE DOING  Enjoy spending time with your family. Look for ways you can help at home.  Find ways to work with your family to solve problems. Follow your family s rules.  Form healthy friendships and find fun, safe things to do with friends.  Set high goals for yourself in school and activities and for your future.  Try to be responsible for your schoolwork and for getting to school or work on time.  Find ways to deal with stress. Talk with your parents or other trusted adults if you need help.  Always talk through problems and never use violence.  If you get angry with someone, walk away if you can.  Call for help if you are in a situation that feels dangerous.  Healthy dating relationships are built on respect, concern, and doing things both of you like to do.  When you re dating or in a sexual situation,  No  means NO. NO is OK.  Don t smoke, vape, use drugs, or drink alcohol. Talk with us if you are worried about alcohol or drug use in your family.    YOUR DAILY LIFE  Visit the dentist at least twice a year.  Brush your teeth at least twice a day and floss once a day.  Be a healthy eater. It helps you do well in school and sports.  Have vegetables, fruits, lean protein, and whole grains at meals and snacks.  Limit fatty, sugary, and salty foods that are low in nutrients, such as candy, chips, and ice cream.  Eat when you re hungry. Stop when you feel satisfied.  Eat with your family often.  Eat breakfast.  Drink plenty of water. Choose water instead of soda or sports drinks.  Make sure to get enough calcium every day.  Have 3 or more servings of low-fat (1%) or fat-free milk and other low-fat dairy products, such as yogurt and cheese.  Aim for at least 1 hour of physical activity every day.  Wear your mouth guard when playing  sports.  Get enough sleep.    YOUR FEELINGS  Be proud of yourself when you do something good.  Figure out healthy ways to deal with stress.  Develop ways to solve problems and make good decisions.  It s OK to feel up sometimes and down others, but if you feel sad most of the time, let us know so we can help you.  It s important for you to have accurate information about sexuality, your physical development, and your sexual feelings toward the opposite or same sex. Please consider asking us if you have any questions.    HEALTHY BEHAVIOR CHOICES  Choose friends who support your decision to not use tobacco, alcohol, or drugs. Support friends who choose not to use.  Avoid situations with alcohol or drugs.  Don t share your prescription medicines. Don t use other people s medicines.  Not having sex is the safest way to avoid pregnancy and sexually transmitted infections (STIs).  Plan how to avoid sex and risky situations.  If you re sexually active, protect against pregnancy and STIs by correctly and consistently using birth control along with a condom.  Protect your hearing at work, home, and concerts. Keep your earbud volume down.    STAYING SAFE  Always be a safe and cautious .  Insist that everyone use a lap and shoulder seat belt.  Limit the number of friends in the car and avoid driving at night.  Avoid distractions. Never text or talk on the phone while you drive.  Do not ride in a vehicle with someone who has been using drugs or alcohol.  If you feel unsafe driving or riding with someone, call someone you trust to drive you.  Wear helmets and protective gear while playing sports. Wear a helmet when riding a bike, a motorcycle, or an ATV or when skiing or skateboarding. Wear a life jacket when you do water sports.  Always use sunscreen and a hat when you re outside.  Fighting and carrying weapons can be dangerous. Talk with your parents, teachers, or doctor about how to avoid these  situations.        Consistent with Bright Futures: Guidelines for Health Supervision of Infants, Children, and Adolescents, 4th Edition  For more information, go to https://brightfutures.aap.org.             Patient Education    BRIGHT FUTURES HANDOUT- PARENT  15 THROUGH 17 YEAR VISITS  Here are some suggestions from [employer] experts that may be of value to your family.     HOW YOUR FAMILY IS DOING  Set aside time to be with your teen and really listen to her hopes and concerns.  Support your teen in finding activities that interest him. Encourage your teen to help others in the community.  Help your teen find and be a part of positive after-school activities and sports.  Support your teen as she figures out ways to deal with stress, solve problems, and make decisions.  Help your teen deal with conflict.  If you are worried about your living or food situation, talk with us. Community agencies and programs such as SNAP can also provide information.    YOUR GROWING AND CHANGING TEEN  Make sure your teen visits the dentist at least twice a year.  Give your teen a fluoride supplement if the dentist recommends it.  Support your teen s healthy body weight and help him be a healthy eater.  Provide healthy foods.  Eat together as a family.  Be a role model.  Help your teen get enough calcium with low-fat or fat-free milk, low-fat yogurt, and cheese.  Encourage at least 1 hour of physical activity a day.  Praise your teen when she does something well, not just when she looks good.    YOUR TEEN S FEELINGS  If you are concerned that your teen is sad, depressed, nervous, irritable, hopeless, or angry, let us know.  If you have questions about your teen s sexual development, you can always talk with us.    HEALTHY BEHAVIOR CHOICES  Know your teen s friends and their parents. Be aware of where your teen is and what he is doing at all times.  Talk with your teen about your values and your expectations on drinking, drug use,  tobacco use, driving, and sex.  Praise your teen for healthy decisions about sex, tobacco, alcohol, and other drugs.  Be a role model.  Know your teen s friends and their activities together.  Lock your liquor in a cabinet.  Store prescription medications in a locked cabinet.  Be there for your teen when she needs support or help in making healthy decisions about her behavior.    SAFETY  Encourage safe and responsible driving habits.  Lap and shoulder seat belts should be used by everyone.  Limit the number of friends in the car and ask your teen to avoid driving at night.  Discuss with your teen how to avoid risky situations, who to call if your teen feels unsafe, and what you expect of your teen as a .  Do not tolerate drinking and driving.  If it is necessary to keep a gun in your home, store it unloaded and locked with the ammunition locked separately from the gun.      Consistent with Bright Futures: Guidelines for Health Supervision of Infants, Children, and Adolescents, 4th Edition  For more information, go to https://brightfutures.aap.org.              28-Jun-2021 22:39

## 2023-09-12 ENCOUNTER — APPOINTMENT (OUTPATIENT)
Dept: ORTHOPEDIC SURGERY | Facility: CLINIC | Age: 88
End: 2023-09-12

## 2024-01-10 ENCOUNTER — APPOINTMENT (OUTPATIENT)
Dept: PSYCHIATRY | Facility: CLINIC | Age: 89
End: 2024-01-10
Payer: MEDICARE

## 2024-01-10 PROCEDURE — 99442: CPT | Mod: 93

## 2024-01-10 RX ORDER — SERTRALINE HYDROCHLORIDE 100 MG/1
100 TABLET, FILM COATED ORAL DAILY
Qty: 135 | Refills: 1 | Status: ACTIVE | COMMUNITY
Start: 2022-05-06 | End: 1900-01-01

## 2024-01-10 NOTE — HISTORY OF PRESENT ILLNESS
[de-identified] : Patient is an 86-year-old female, , retired.  Patient lives by herself.  Patient states her psychiatrist is retiring she has seen him for approximately 30 years.  During that time she has been well maintained on Zoloft 100 mg along with lorazepam 1 mg.  Patient feels that she is very stable on these medications.  Situation began to get serious and go on medication about 2530 years ago when she was having difficulty with her .  He was an alcoholic who is also gambling.  During the same time her son was diagnosed with schizophrenia was very difficult with multiple hospitalizations.  Right now she feels that she does not need any changes in her medication she is doing well with them.  She feels that life is going well she still can drive if she goes out she has a good group of friends that she socializes with.  She lives close to her other son she spends a lot of time with her grandson age 12.  After the divorce the patient to help take care of her  he had surgeries done he eventually  about a year ago.  She states that occasionally she gets a little anxious in the morning she might be a little down.  She does have difficulty falling asleep at night for which she takes the lorazepam.\par  \par  Patient gets up at about 8:00 in the morning.  In the morning she will have a couple coffee read the paper in the afternoon she will do some housework shop go to different appointments in the evening she will make dinner watch television read the paper she goes to bed about 1:00 in the morning.  It takes about an hour to fall asleep once asleep she stays asleep appetite is good height 4 feet 11 weight 135 pounds.  Energy level is good. [FreeTextEntry1] : Moods are better with the increase in Zoloft.  Lorazepam useful for taking care of anxiety.  No new medications or medical problems.

## 2024-01-10 NOTE — DISCUSSION/SUMMARY
[FreeTextEntry1] : 88-year-old female with depression anxiety.  Plan continue lorazepam 1 mg as needed Zoloft to 150 mg.  Follow-up in 6 months.

## 2024-01-10 NOTE — FAMILY HISTORY
[FreeTextEntry1] : Patient born in [place] mother  of a stroke Father  of a heart attack she had 2 brothers 1  of a heart attack 1  of cancer.  One of the brothers had a history of alcoholism.  Her sister  of cancer she had a history of depression.  No other psychiatric alcohol or drug history in the family.  No abuse history growing up.

## 2024-01-10 NOTE — CURRENT PSYCHIATRIC SYMPTOMS
[Insomnia] : no insomnia disorder [de-identified] : Denied [de-identified] : Denied [de-identified] : Denied [de-identified] : Denied [de-identified] : None [de-identified] : None [de-identified] : None

## 2024-01-10 NOTE — REASON FOR VISIT
[Patient preference] : as per patient preference [Telephone (audio) - Couple/Family] : This telephonic visit was provided via audio only technology. [Medical Office: ([facility])___] : The provider was located at the medical office in [unfilled]. [Home] : The patient, [unfilled], was located at home, [unfilled], at the time of the visit. [Patient] : Patient [FreeTextEntry1] : Depression

## 2024-01-26 ENCOUNTER — EMERGENCY (EMERGENCY)
Facility: HOSPITAL | Age: 89
LOS: 1 days | Discharge: HOME | End: 2024-01-26
Attending: EMERGENCY MEDICINE
Payer: MEDICARE

## 2024-01-26 VITALS
HEART RATE: 69 BPM | TEMPERATURE: 98 F | SYSTOLIC BLOOD PRESSURE: 142 MMHG | WEIGHT: 134.92 LBS | RESPIRATION RATE: 18 BRPM | OXYGEN SATURATION: 97 % | HEIGHT: 60 IN | DIASTOLIC BLOOD PRESSURE: 82 MMHG

## 2024-01-26 VITALS
RESPIRATION RATE: 17 BRPM | OXYGEN SATURATION: 97 % | SYSTOLIC BLOOD PRESSURE: 140 MMHG | TEMPERATURE: 98 F | DIASTOLIC BLOOD PRESSURE: 72 MMHG | HEART RATE: 65 BPM

## 2024-01-26 DIAGNOSIS — E04.1 NONTOXIC SINGLE THYROID NODULE: Chronic | ICD-10-CM

## 2024-01-26 DIAGNOSIS — Z98.890 OTHER SPECIFIED POSTPROCEDURAL STATES: Chronic | ICD-10-CM

## 2024-01-26 LAB
ALBUMIN SERPL ELPH-MCNC: 4.1 G/DL — SIGNIFICANT CHANGE UP (ref 3.3–5)
ALP SERPL-CCNC: 127 U/L — HIGH (ref 40–120)
ALT FLD-CCNC: 20 U/L — SIGNIFICANT CHANGE UP (ref 10–45)
ANION GAP SERPL CALC-SCNC: 12 MMOL/L — SIGNIFICANT CHANGE UP (ref 5–17)
APPEARANCE UR: CLEAR — SIGNIFICANT CHANGE UP
AST SERPL-CCNC: 23 U/L — SIGNIFICANT CHANGE UP (ref 10–40)
BASOPHILS # BLD AUTO: 0.05 K/UL — SIGNIFICANT CHANGE UP (ref 0–0.2)
BASOPHILS NFR BLD AUTO: 0.6 % — SIGNIFICANT CHANGE UP (ref 0–2)
BILIRUB SERPL-MCNC: 0.2 MG/DL — SIGNIFICANT CHANGE UP (ref 0.2–1.2)
BILIRUB UR-MCNC: NEGATIVE — SIGNIFICANT CHANGE UP
BUN SERPL-MCNC: 20 MG/DL — SIGNIFICANT CHANGE UP (ref 7–23)
CALCIUM SERPL-MCNC: 9 MG/DL — SIGNIFICANT CHANGE UP (ref 8.4–10.5)
CHLORIDE SERPL-SCNC: 93 MMOL/L — LOW (ref 96–108)
CO2 SERPL-SCNC: 26 MMOL/L — SIGNIFICANT CHANGE UP (ref 22–31)
COLOR SPEC: YELLOW — SIGNIFICANT CHANGE UP
CREAT SERPL-MCNC: 0.52 MG/DL — SIGNIFICANT CHANGE UP (ref 0.5–1.3)
DIFF PNL FLD: NEGATIVE — SIGNIFICANT CHANGE UP
EGFR: 90 ML/MIN/1.73M2 — SIGNIFICANT CHANGE UP
EOSINOPHIL # BLD AUTO: 0.39 K/UL — SIGNIFICANT CHANGE UP (ref 0–0.5)
EOSINOPHIL NFR BLD AUTO: 5.1 % — SIGNIFICANT CHANGE UP (ref 0–6)
FLUAV AG NPH QL: SIGNIFICANT CHANGE UP
FLUBV AG NPH QL: SIGNIFICANT CHANGE UP
GLUCOSE SERPL-MCNC: 88 MG/DL — SIGNIFICANT CHANGE UP (ref 70–99)
GLUCOSE UR QL: NEGATIVE MG/DL — SIGNIFICANT CHANGE UP
HCT VFR BLD CALC: 34.3 % — LOW (ref 34.5–45)
HGB BLD-MCNC: 11.8 G/DL — SIGNIFICANT CHANGE UP (ref 11.5–15.5)
IMM GRANULOCYTES NFR BLD AUTO: 0.4 % — SIGNIFICANT CHANGE UP (ref 0–0.9)
KETONES UR-MCNC: NEGATIVE MG/DL — SIGNIFICANT CHANGE UP
LEUKOCYTE ESTERASE UR-ACNC: NEGATIVE — SIGNIFICANT CHANGE UP
LYMPHOCYTES # BLD AUTO: 0.95 K/UL — LOW (ref 1–3.3)
LYMPHOCYTES # BLD AUTO: 12.3 % — LOW (ref 13–44)
MAGNESIUM SERPL-MCNC: 2.2 MG/DL — SIGNIFICANT CHANGE UP (ref 1.6–2.6)
MCHC RBC-ENTMCNC: 30.6 PG — SIGNIFICANT CHANGE UP (ref 27–34)
MCHC RBC-ENTMCNC: 34.4 GM/DL — SIGNIFICANT CHANGE UP (ref 32–36)
MCV RBC AUTO: 89.1 FL — SIGNIFICANT CHANGE UP (ref 80–100)
MONOCYTES # BLD AUTO: 0.75 K/UL — SIGNIFICANT CHANGE UP (ref 0–0.9)
MONOCYTES NFR BLD AUTO: 9.7 % — SIGNIFICANT CHANGE UP (ref 2–14)
NEUTROPHILS # BLD AUTO: 5.54 K/UL — SIGNIFICANT CHANGE UP (ref 1.8–7.4)
NEUTROPHILS NFR BLD AUTO: 71.9 % — SIGNIFICANT CHANGE UP (ref 43–77)
NITRITE UR-MCNC: NEGATIVE — SIGNIFICANT CHANGE UP
NRBC # BLD: 0 /100 WBCS — SIGNIFICANT CHANGE UP (ref 0–0)
PH UR: 7.5 — SIGNIFICANT CHANGE UP (ref 5–8)
PLATELET # BLD AUTO: 252 K/UL — SIGNIFICANT CHANGE UP (ref 150–400)
POTASSIUM SERPL-MCNC: 4.5 MMOL/L — SIGNIFICANT CHANGE UP (ref 3.5–5.3)
POTASSIUM SERPL-SCNC: 4.5 MMOL/L — SIGNIFICANT CHANGE UP (ref 3.5–5.3)
PROT SERPL-MCNC: 7.3 G/DL — SIGNIFICANT CHANGE UP (ref 6–8.3)
PROT UR-MCNC: NEGATIVE MG/DL — SIGNIFICANT CHANGE UP
RBC # BLD: 3.85 M/UL — SIGNIFICANT CHANGE UP (ref 3.8–5.2)
RBC # FLD: 12.9 % — SIGNIFICANT CHANGE UP (ref 10.3–14.5)
RSV RNA NPH QL NAA+NON-PROBE: SIGNIFICANT CHANGE UP
SARS-COV-2 RNA SPEC QL NAA+PROBE: SIGNIFICANT CHANGE UP
SODIUM SERPL-SCNC: 131 MMOL/L — LOW (ref 135–145)
SP GR SPEC: 1.01 — SIGNIFICANT CHANGE UP (ref 1–1.03)
UROBILINOGEN FLD QL: 0.2 MG/DL — SIGNIFICANT CHANGE UP (ref 0.2–1)
WBC # BLD: 7.71 K/UL — SIGNIFICANT CHANGE UP (ref 3.8–10.5)
WBC # FLD AUTO: 7.71 K/UL — SIGNIFICANT CHANGE UP (ref 3.8–10.5)

## 2024-01-26 PROCEDURE — 99285 EMERGENCY DEPT VISIT HI MDM: CPT

## 2024-01-26 PROCEDURE — 99285 EMERGENCY DEPT VISIT HI MDM: CPT | Mod: 25

## 2024-01-26 PROCEDURE — 80053 COMPREHEN METABOLIC PANEL: CPT

## 2024-01-26 PROCEDURE — 71046 X-RAY EXAM CHEST 2 VIEWS: CPT

## 2024-01-26 PROCEDURE — 83735 ASSAY OF MAGNESIUM: CPT

## 2024-01-26 PROCEDURE — 81003 URINALYSIS AUTO W/O SCOPE: CPT

## 2024-01-26 PROCEDURE — 71046 X-RAY EXAM CHEST 2 VIEWS: CPT | Mod: 26

## 2024-01-26 PROCEDURE — 87086 URINE CULTURE/COLONY COUNT: CPT

## 2024-01-26 PROCEDURE — 85025 COMPLETE CBC W/AUTO DIFF WBC: CPT

## 2024-01-26 PROCEDURE — 84484 ASSAY OF TROPONIN QUANT: CPT

## 2024-01-26 PROCEDURE — 87637 SARSCOV2&INF A&B&RSV AMP PRB: CPT

## 2024-01-26 PROCEDURE — 93005 ELECTROCARDIOGRAM TRACING: CPT

## 2024-01-26 PROCEDURE — 96374 THER/PROPH/DIAG INJ IV PUSH: CPT

## 2024-01-26 PROCEDURE — 36415 COLL VENOUS BLD VENIPUNCTURE: CPT

## 2024-01-26 PROCEDURE — 84443 ASSAY THYROID STIM HORMONE: CPT

## 2024-01-26 PROCEDURE — 84480 ASSAY TRIIODOTHYRONINE (T3): CPT

## 2024-01-26 PROCEDURE — 84436 ASSAY OF TOTAL THYROXINE: CPT

## 2024-01-26 RX ORDER — ONDANSETRON 8 MG/1
4 TABLET, FILM COATED ORAL ONCE
Refills: 0 | Status: COMPLETED | OUTPATIENT
Start: 2024-01-26 | End: 2024-01-26

## 2024-01-26 RX ADMIN — ONDANSETRON 4 MILLIGRAM(S): 8 TABLET, FILM COATED ORAL at 19:58

## 2024-01-26 NOTE — ED ADULT NURSE NOTE - OBJECTIVE STATEMENT
89y/o F coming to the ED c.o fatigue and weakness. Pt states that she was dx with cov on 1/2/24 and has been feeling generalized weakness, fatigue and decreased PO intake. Pt states that she was een by her dr rodríguez and had a negative Ctscan of her abdomen and was placed on ABX and has been feeling worse since ABX. On exam, pt is breathing spontaneously, able to speak full sentences w.o difficulty, saturating 98% RA. Abdomen soft, nontender, nondistended. IV placed, labs collected and sent. VSS.

## 2024-01-26 NOTE — ED PROVIDER NOTE - CLINICAL SUMMARY MEDICAL DECISION MAKING FREE TEXT BOX
87 y/o F PmHx GERD, HLD, HTN, Hypothyroidism s/p partial thyroidectomy presents with fatigue and nausea. The patient was diagnosed with COVID-19 on 01/02/2024, since the diagnosis she has felt persistent nausea and fatigue. Patient had outpatient CT abdomen/pelvis with no acute findings and has been taking amoxcillin BID for the last 4 days as prescribed by her PCP. On arrival patient is HD stable. PE with no abdominal tenderness, no rhonchi/rales/crackles on lung auscultation. Differential includes long covid, pna, uti, hypothyroidism.

## 2024-01-26 NOTE — ED PROVIDER NOTE - PROGRESS NOTE DETAILS
Maddie DUMAS, PGY-1;    Patient has not vomitted since arrival to ED, patient requesting to go home as soon as possible. Workup does not have explanation for her symptoms. Will follow-up with

## 2024-01-26 NOTE — ED PROVIDER NOTE - OBJECTIVE STATEMENT
87 y/o F PmHx GERD, HLD, HTN, Hypothyroidism s/p partial thyroidectomy presents with fatigue and nausea. The patient was diagnosed with COVID-19 on 01/02/2024, since the diagnosis she has felt persistent nausea and fatigue. She states fever, cough, nasal congestion, sore throat has resolved. Endorses that she had a UTI around the same timeframe that she was diagnosed with COVID-19. She f/u with her PCP who ordered CT abdomen/pelvis with no acute findings per patient. Additionally, her PCP prescribed amoxicillin BID, she has taken 4 days worth of symptoms. Patient denies chest pain, sob, exertional dyspnea, dysuria, vaginal bleeding, abdominal pain, flank pain, recent trauma.

## 2024-01-26 NOTE — ED PROVIDER NOTE - NSFOLLOWUPINSTRUCTIONS_ED_ALL_ED_FT
Today you were seen at [facility] for fatigue and nausea.  While you are here we sent blood work.    Your electrolytes are within normal limits.  Your white blood cell count was within normal limits.  There are no signs of elevated white blood cells that would indicate infection.    We sent a urine that shows no signs of infection at this time.    We are still awaiting 3 labs which include TSH, T3, T4.  You can follow-up these results on my health portal.    We recommend that you continue take antibiotic as prescribed by your physician, additionally please take Zofran at home as prescribed by your previous physician.      Return to ED for inability to tolerate p.o., chest pain, shortness of breath, abdominal pain.

## 2024-01-26 NOTE — ED PROVIDER NOTE - PATIENT PORTAL LINK FT
You can access the FollowMyHealth Patient Portal offered by [facility] by registering at the following website: [email or web address]. By joining [employer]’s FollowMyHealth portal, you will also be able to view your health information using other applications (apps) compatible with our system.

## 2024-01-26 NOTE — ED ADULT NURSE NOTE - NSHOSCREENINGQ1_ED_ALL_ED
Peer review with Dr. Meneses  Approved proceeding with CPAP without repeat Home Sleep Study. Orders for CPAP with addendum to office note 4/3/2023.    Telephoned advised patient and to call [facility] [phone] if he has not heard from [facility] within next 10 days.   
No

## 2024-01-26 NOTE — ED ADULT NURSE NOTE - NSFALLRISKINTERV_ED_ALL_ED

## 2024-01-26 NOTE — ED PROVIDER NOTE - ATTENDING CONTRIBUTION TO CARE
88-year-old female with history of GERD hyperlipidemia lipidemia hypertension hypothyroidism partial thyroidectomy presenting with fatigue and nausea for several days patient was diagnosed with COVID and has been recovered however continues to feel unwell has been having subjective fevers cough congestion that has mostly resolved she also reports had a UTI that was treated she had a CT abdomen pelvis that was done as an outpatient by her primary and started a course of amoxicillin but continues to have symptoms  On physical examination patient is well-appearing no acute distress calm comfortable neuro grossly intact clear oropharynx clear lungs S1-S2 soft nontender abdomen  Concern for viral infection versus pneumonia we will do labs x-rays swab UA and if workup is unremarkable patient is happy to follow-up as an outpatient

## 2024-01-27 LAB
CULTURE RESULTS: SIGNIFICANT CHANGE UP
SPECIMEN SOURCE: SIGNIFICANT CHANGE UP
T3 SERPL-MCNC: 63 NG/DL — LOW (ref 80–200)
T4 AB SER-ACNC: 6.3 UG/DL — SIGNIFICANT CHANGE UP (ref 4.6–12)
TSH SERPL-MCNC: 2.14 UIU/ML — SIGNIFICANT CHANGE UP (ref 0.27–4.2)

## 2024-03-22 ENCOUNTER — APPOINTMENT (OUTPATIENT)
Dept: ORTHOPEDIC SURGERY | Facility: CLINIC | Age: 89
End: 2024-03-22
Payer: MEDICARE

## 2024-03-22 VITALS — HEIGHT: 60 IN | WEIGHT: 135 LBS | BODY MASS INDEX: 26.5 KG/M2

## 2024-03-22 DIAGNOSIS — M25.552 PAIN IN LEFT HIP: ICD-10-CM

## 2024-03-22 PROCEDURE — 99213 OFFICE O/P EST LOW 20 MIN: CPT

## 2024-03-22 PROCEDURE — 73502 X-RAY EXAM HIP UNI 2-3 VIEWS: CPT

## 2024-03-22 PROCEDURE — 72100 X-RAY EXAM L-S SPINE 2/3 VWS: CPT

## 2024-03-27 PROBLEM — M25.552 LEFT HIP PAIN: Status: ACTIVE | Noted: 2022-01-14

## 2024-03-27 NOTE — PHYSICAL EXAM
[de-identified] : Constitutional:  88 year old female, alert and oriented, cooperative, in no acute distress.  HEENT  NC/AT.  Appearance: symmetric  Neck/Back Straight without deformity or instability.  Good ROM.  Chest/Respiratory  Respiratory effort: no intercostal retractions or use of accessory muscles. Nonlabored Breathing  Mental Status:  Judgment, insight: intact Orientation: oriented to time, place, and person  Neurological: Sensory and Motor are grossly intact throughout  Left Hip Exam:  Tenderness:  	Sacroiliac: Negative  	Greater trochanter: Positive                  ISIS Test: Negative                 FADIR Test: Negative  Range of Motion:                 Extension - 0  	Flexion - 100  	IR - 20  	ER - 40 	Abd - 45  	Add - 30   Neurologic Exam     Motor intact including 5/5 Extensor Hallucis Longus, 5/5 Flexor Hallucis Longus, 5/5 Tibialis Anterior and 5/5 Gastrocnemius     Sensation Intact to Light Touch including Saphenous, Sural, Superficial Peroneal, Deep Peroneal, Tibial nerve distributions  Vascular Exam     Foot is warm and well perfused with 2+ Dorsalis Pedis Pulse  No pain with range of motion of the right hip or bilateral knees. No lumbar paraspinal muscle tenderness.  [de-identified] : XRay: XRays of the Pelvis (1 View) and Left Hip (2 Views) taken in the office today and discussed with the patient. XRays demonstrate joint space narrowing in the hip joint with subchondral sclerosis, consistent with mild osteoarthritis, Ton[place] ndGndrndanddndend:nd nd2nd. There are no obvious fractures or dislocations. (my personal interpretation)   XRay:  XRays of the Lumbar Spine (2 Views) taken in the office today and discussed with the patient. XRays demonstrate no obvious fracture or dislocation. There are multilevel degenerative changes seen. (my personal interpretation).

## 2024-03-27 NOTE — DISCUSSION/SUMMARY
[de-identified] : Rosa Rader is a 88 year old female who presented to the office for evaluation of her left hip pain. Patient had a fall on Tuesday onto her left hip. XRays showed no obvious fractures or dislocations. Examination showed tenderness over the greater trochanter. Discussed with the patient the examination and imaging findings. Discussed the management of patient's hip pain at this time, including home exercises and pain control. Patient will continue her home exercises. She will take Tylenol as needed for her pain control. Patient will follow up with pain management. Patient will follow up in 2 months for reevaluation and management. Patient understanding and in agreement with the plan. All questions answered.  Plan: -Home Exercises -Tylenol -Follow up with pain management -Follow up in 2 months for reevaluation and management

## 2024-03-27 NOTE — HISTORY OF PRESENT ILLNESS
[de-identified] : Rosa Rader is a 88 year old female who presented to the office for evaluation of her left hip pain. On Tuesday, patient was getting up from a chair, lost her balance and fell onto the left side. Pain is located over the posterior thigh and lower back. She has a history of back issues, which is managed by pain management. She is unable to take NSAIDs due to her GI issues. She has tried Oxycodone and Physical therapy. Patient does home exercises. No reported head strike. She has been able to ambulate and walks with a cane.  History: None

## 2024-04-29 RX ORDER — LORAZEPAM 1 MG/1
1 TABLET ORAL
Qty: 90 | Refills: 0 | Status: ACTIVE | COMMUNITY
Start: 2022-05-06 | End: 1900-01-01

## 2024-06-10 ENCOUNTER — APPOINTMENT (OUTPATIENT)
Dept: PSYCHIATRY | Facility: CLINIC | Age: 89
End: 2024-06-10

## 2024-06-27 ENCOUNTER — APPOINTMENT (OUTPATIENT)
Dept: PSYCHIATRY | Facility: CLINIC | Age: 89
End: 2024-06-27
Payer: MEDICARE

## 2024-06-27 DIAGNOSIS — F41.9 ANXIETY DISORDER, UNSPECIFIED: ICD-10-CM

## 2024-06-27 DIAGNOSIS — F33.9 MAJOR DEPRESSIVE DISORDER, RECURRENT, UNSPECIFIED: ICD-10-CM

## 2024-06-27 PROCEDURE — 90792 PSYCH DIAG EVAL W/MED SRVCS: CPT

## 2024-09-26 ENCOUNTER — APPOINTMENT (OUTPATIENT)
Dept: PSYCHIATRY | Facility: CLINIC | Age: 89
End: 2024-09-26
Payer: MEDICARE

## 2024-09-26 DIAGNOSIS — F33.9 MAJOR DEPRESSIVE DISORDER, RECURRENT, UNSPECIFIED: ICD-10-CM

## 2024-09-26 DIAGNOSIS — F41.9 ANXIETY DISORDER, UNSPECIFIED: ICD-10-CM

## 2024-09-26 PROCEDURE — 99214 OFFICE O/P EST MOD 30 MIN: CPT

## 2024-09-26 PROCEDURE — G2211 COMPLEX E/M VISIT ADD ON: CPT

## 2024-09-26 RX ORDER — MIRTAZAPINE 7.5 MG/1
7.5 TABLET, FILM COATED ORAL
Qty: 30 | Refills: 0 | Status: ACTIVE | COMMUNITY
Start: 2024-09-26 | End: 1900-01-01

## 2024-09-26 NOTE — HISTORY OF PRESENT ILLNESS
[FreeTextEntry1] : Refer to intake documentation from 6/27/24 for details.  [FreeTextEntry2] : Refer to intake documentation from 6/27/24 for details.    [FreeTextEntry3] : Current psych meds: --sertraline --lorazepam (tapering)  Past psych meds: --denied

## 2024-09-26 NOTE — DISCUSSION/SUMMARY
[Low acute suicide risk] : Low acute suicide risk [No] : No [Not clinically indicated] : Safety Plan completed/updated (for individuals at risk): Not clinically indicated [FreeTextEntry1] : 6/27/24- Continue sertraline. Decrease lorazepam to 0.5 mg or 0.75 mg qHS. Follow-up in 3 months.  9/26/24- Start mirtazapine 7.5 mg qHS. Continue sertraline 150 mg daily and lorazepam 0.5 mg QHS. Will not make another decrease in lorazepam today.

## 2024-09-26 NOTE — PAST MEDICAL HISTORY
[FreeTextEntry1] : Reviewed.  PCP: Piter Armando Relevant Labs/EKG: none available for review  Taking oxycodone as little as possible, 30 day supply lasts nearly 90 days. Per chart, review, had recent fall in March 2024. Ambulates with a cane.

## 2024-09-26 NOTE — PLAN
[FreeTextEntry5] : 1. Unspecified Anxiety Disorder, 2. MDD, recurrent, current episode mild --Continue sertraline 150 mg daily --Continue lorazepam 0.5 mg qHS for now --Start mirtazapine 7.5 mg qHS  --Follow-up in 2 weeks or sooner PRN --Re-requested she fax records from PCP with updated labs   -[facility] reviewed prior to all controlled substance prescriptions, ref# [id number]. - I have given my usual talk about the risks and benefits of all treatment recommendations including alternatives and the risks and benefits of no treatment at all. Patient had the opportunity to have all questions answered to their satisfaction. They expressed understanding and agreement with the above treatment plan. - Educated patient of importance of remaining abstinent from drugs and alcohol.  - Discussed that unpredictable effects including cardiorespiratory collapse can result from the combination of illicit drugs and prescribed medications. Patient verbalized understanding. - Emergency procedures were discussed: pt. educated to call 911 or go to nearest ER for worsening of symptoms/suicidal/homicidal ideation. - Patient given opportunity to ask questions and their questions were answered and they expressed understanding and agreement with above plan.

## 2024-09-26 NOTE — RISK ASSESSMENT
[Clinical Interview] : Clinical Interview [None in the patient's lifetime] : None in the patient's lifetime [No, patient denies ideation or behavior] : No, patient denies ideation or behavior [Low acute suicide risk] : Low acute suicide risk [No] : No [Not clinically indicated] : Safety Plan completed/updated (for individuals at risk): Not clinically indicated

## 2024-09-26 NOTE — REASON FOR VISIT
[Patient preference] : as per patient preference [Starting, patient seen in-person within last 6 months] : Telehealth services are being started as patient has seen in-person within last 6 months. [Medical Office: ([facility])___] : The provider was located at the medical office in [unfilled]. [Home] : The patient, [unfilled], was located at home, [unfilled], at the time of the visit. [Verbal consent obtained from patient/other participant(s)] : Verbal consent for telehealth/telephonic services obtained from patient/other participant(s) [Patient] : Patient [Telephone (audio) - Individual/Group] : This telephonic visit was provided via audio only technology. [Technical or other issues] : Patient unable to effectively utilize tele-video due to technical or other issues. [FreeTextEntry4] : 14:00 [FreeTextEntry5] : 14:30 [FreeTextEntry2] : 6/27/24 [FreeTextEntry1] : psychopharm management f/u

## 2024-09-26 NOTE — SOCIAL HISTORY
[FreeTextEntry1] : Refer to intake documentation from 6/27/24 for details. Updates as per interval history.

## 2024-09-26 NOTE — PHYSICAL EXAM
[Cooperative] : cooperative [Full] : full [Clear] : clear [Linear/Goal Directed] : linear/goal directed [WNL] : within normal limits [FreeTextEntry2] : unable to assess [FreeTextEntry4] : unable to assess [FreeTextEntry1] : unable to assess [de-identified] : unable to assess [de-identified] : unable to assess [FreeTextEntry8] : "a little bit down" [de-identified] : subjectively wnl

## 2024-10-17 ENCOUNTER — APPOINTMENT (OUTPATIENT)
Dept: PSYCHIATRY | Facility: CLINIC | Age: 89
End: 2024-10-17
Payer: MEDICARE

## 2024-10-17 DIAGNOSIS — F33.9 MAJOR DEPRESSIVE DISORDER, RECURRENT, UNSPECIFIED: ICD-10-CM

## 2024-10-17 PROCEDURE — 99214 OFFICE O/P EST MOD 30 MIN: CPT

## 2025-01-27 NOTE — PROGRESS NOTE ADULT - PROBLEM SELECTOR PLAN 3
No - hyponatremia likely secondary to stress related to infection and/or decreased PO intake  - Will follow-up Na level  - will give gentle hydration 50cc/hr due to poor intake and vomiting

## 2025-02-15 ENCOUNTER — NON-APPOINTMENT (OUTPATIENT)
Age: 89
End: 2025-02-15

## 2025-03-04 NOTE — ED PROVIDER NOTE - INTERNATIONAL TRAVEL
Shayla, I'm happy to see that your tests today are normal, but I'm still concerned about your symptoms.   If things get worse at all then I want you to go to the emergency department for further testing.      They can do more heart testing and more blood work than what we can do here in the urgent care clinic.    Normal troponin is reassuring.  But you may need a longer period of cardiac monitoring to find out why your heartbeat keeps going fast.    In the meantime please make sure you are getting plenty of rest, drinking fluids with electrolytes and getting good protein in your diet to help maintain good energy levels.    We will notify you when the COVID results come in.   No

## 2025-03-21 NOTE — ED ADULT TRIAGE NOTE - TEMPERATURE IN FAHRENHEIT (DEGREES F)
Vent Settings Last Value   FiO2 90 % (03/21/25 0300)   Mode Volume A/C (03/21/25 0256)   Rate 18 (03/21/25 0300)   Tidal Volume 300 mL (03/21/25 0300)   Pressure Support     PEEP/CPAC/EPAP 5 cm H20 (03/21/25 0300)    Visit Vitals  BP (!) 86/50   Pulse 74   Temp 98.3 °F (36.8 °C) (Oral)   Resp 18   Wt 46.7 kg (103 lb)   SpO2 96%    Patient came to the ICU from the ED at the beginner of the shift intubated. Patient remains intubated on the setting above tolerating it well. ETT size is 7.0 cuffed 19@ the Teeth.   98.2

## 2025-07-07 ENCOUNTER — APPOINTMENT (OUTPATIENT)
Dept: ORTHOPEDIC SURGERY | Facility: CLINIC | Age: 89
End: 2025-07-07
Payer: MEDICARE

## 2025-07-07 VITALS — BODY MASS INDEX: 25.52 KG/M2 | HEIGHT: 60 IN | WEIGHT: 130 LBS

## 2025-07-07 PROBLEM — M54.9 ACUTE UPPER BACK PAIN: Status: ACTIVE | Noted: 2025-07-07

## 2025-07-07 PROCEDURE — 99214 OFFICE O/P EST MOD 30 MIN: CPT

## 2025-07-07 PROCEDURE — 72040 X-RAY EXAM NECK SPINE 2-3 VW: CPT

## 2025-07-07 RX ORDER — FAMOTIDINE 20 MG/1
20 TABLET, FILM COATED ORAL TWICE DAILY
Qty: 60 | Refills: 1 | Status: ACTIVE | COMMUNITY
Start: 2025-07-07 | End: 1900-01-01

## 2025-07-07 RX ORDER — MELOXICAM 15 MG/1
15 TABLET ORAL
Qty: 30 | Refills: 0 | Status: ACTIVE | COMMUNITY
Start: 2025-07-07 | End: 1900-01-01

## 2025-07-08 PROBLEM — Z87.898 HISTORY OF HEARTBURN: Status: RESOLVED | Noted: 2025-07-08 | Resolved: 2025-07-08

## 2025-07-08 PROBLEM — Z86.39 HISTORY OF HYPERLIPIDEMIA: Status: RESOLVED | Noted: 2025-07-08 | Resolved: 2025-07-08

## 2025-07-08 PROBLEM — Z86.59 HISTORY OF DEPRESSION: Status: RESOLVED | Noted: 2025-07-08 | Resolved: 2025-07-08

## 2025-07-08 PROBLEM — Z86.69 HISTORY OF GLAUCOMA: Status: RESOLVED | Noted: 2025-07-08 | Resolved: 2025-07-08

## 2025-07-08 PROBLEM — Z86.39 HISTORY OF HYPOTHYROIDISM: Status: RESOLVED | Noted: 2025-07-08 | Resolved: 2025-07-08

## 2025-07-08 PROBLEM — Z86.79 HISTORY OF ESSENTIAL HYPERTENSION: Status: RESOLVED | Noted: 2025-07-08 | Resolved: 2025-07-08

## 2025-08-04 ENCOUNTER — APPOINTMENT (OUTPATIENT)
Dept: ORTHOPEDIC SURGERY | Facility: CLINIC | Age: 89
End: 2025-08-04
Payer: MEDICARE

## 2025-08-04 VITALS — HEIGHT: 58 IN | BODY MASS INDEX: 27.29 KG/M2 | WEIGHT: 130 LBS

## 2025-08-04 DIAGNOSIS — M54.2 CERVICALGIA: ICD-10-CM

## 2025-08-04 DIAGNOSIS — M47.812 SPONDYLOSIS W/OUT MYELOPATHY OR RADICULOPATHY, CERVICAL REGION: ICD-10-CM

## 2025-08-04 DIAGNOSIS — M54.50 LOW BACK PAIN, UNSPECIFIED: ICD-10-CM

## 2025-08-04 PROCEDURE — 99213 OFFICE O/P EST LOW 20 MIN: CPT
